# Patient Record
Sex: MALE | Race: ASIAN | Employment: FULL TIME | URBAN - METROPOLITAN AREA
[De-identification: names, ages, dates, MRNs, and addresses within clinical notes are randomized per-mention and may not be internally consistent; named-entity substitution may affect disease eponyms.]

---

## 2017-04-05 ENCOUNTER — TELEPHONE (OUTPATIENT)
Dept: INTERNAL MEDICINE CLINIC | Facility: CLINIC | Age: 57
End: 2017-04-05

## 2017-04-05 RX ORDER — VALSARTAN AND HYDROCHLOROTHIAZIDE 320; 25 MG/1; MG/1
TABLET, FILM COATED ORAL
Qty: 90 TABLET | Refills: 0 | Status: SHIPPED | OUTPATIENT
Start: 2017-04-05 | End: 2017-07-17

## 2017-04-05 RX ORDER — ATORVASTATIN CALCIUM 20 MG/1
TABLET, FILM COATED ORAL
Qty: 90 TABLET | Refills: 0 | Status: SHIPPED | OUTPATIENT
Start: 2017-04-05 | End: 2017-07-17

## 2017-05-09 ENCOUNTER — TELEPHONE (OUTPATIENT)
Dept: INTERNAL MEDICINE CLINIC | Facility: CLINIC | Age: 57
End: 2017-05-09

## 2017-05-09 DIAGNOSIS — E11.9 TYPE 2 DIABETES MELLITUS WITHOUT COMPLICATION, WITHOUT LONG-TERM CURRENT USE OF INSULIN (HCC): Primary | ICD-10-CM

## 2017-05-09 NOTE — TELEPHONE ENCOUNTER
Per pt, he has an appt on 05/19/2017 and would like to know if he can go for his blood work prior to his appt so that he can dis cuss result with dr on his appt date.

## 2017-05-15 ENCOUNTER — APPOINTMENT (OUTPATIENT)
Dept: LAB | Facility: HOSPITAL | Age: 57
End: 2017-05-15
Attending: INTERNAL MEDICINE
Payer: COMMERCIAL

## 2017-05-15 DIAGNOSIS — E11.9 TYPE 2 DIABETES MELLITUS WITHOUT COMPLICATION, WITHOUT LONG-TERM CURRENT USE OF INSULIN (HCC): ICD-10-CM

## 2017-05-15 PROCEDURE — 83036 HEMOGLOBIN GLYCOSYLATED A1C: CPT

## 2017-05-15 PROCEDURE — 36415 COLL VENOUS BLD VENIPUNCTURE: CPT

## 2017-05-19 ENCOUNTER — OFFICE VISIT (OUTPATIENT)
Dept: INTERNAL MEDICINE CLINIC | Facility: CLINIC | Age: 57
End: 2017-05-19

## 2017-05-19 VITALS
BODY MASS INDEX: 23.57 KG/M2 | RESPIRATION RATE: 16 BRPM | HEART RATE: 75 BPM | WEIGHT: 174 LBS | HEIGHT: 72 IN | DIASTOLIC BLOOD PRESSURE: 66 MMHG | SYSTOLIC BLOOD PRESSURE: 122 MMHG

## 2017-05-19 DIAGNOSIS — I10 ESSENTIAL HYPERTENSION: ICD-10-CM

## 2017-05-19 DIAGNOSIS — E11.9 TYPE 2 DIABETES MELLITUS WITHOUT COMPLICATION, WITHOUT LONG-TERM CURRENT USE OF INSULIN (HCC): Primary | ICD-10-CM

## 2017-05-19 DIAGNOSIS — G47.33 OBSTRUCTIVE SLEEP APNEA: ICD-10-CM

## 2017-05-19 PROCEDURE — 99212 OFFICE O/P EST SF 10 MIN: CPT | Performed by: INTERNAL MEDICINE

## 2017-05-19 PROCEDURE — 99213 OFFICE O/P EST LOW 20 MIN: CPT | Performed by: INTERNAL MEDICINE

## 2017-05-19 NOTE — PROGRESS NOTES
Boni Doe is a 56year old male. Patient presents with:  Diabetes  Hypertension  Hyperlipidemia    HPI:   Ti Schmitt presents this morning for six-month follow-up of type 2 diabetes, hypertension, hypercholesterolemia and obstructive sleep apnea.     He feels Comment: Infrequent         EXAM:   GENERAL: Pleasant male appearing well in no distress  /66 mmHg  Pulse 75  Resp 16  Ht 6' (1.829 m)  Wt 174 lb (78.926 kg)  BMI 23.59 kg/m2  LUNGS: Resonant to percussion and clear to auscultation  CARDIAC: Rhythm

## 2017-05-31 ENCOUNTER — TELEPHONE (OUTPATIENT)
Dept: INTERNAL MEDICINE CLINIC | Facility: CLINIC | Age: 57
End: 2017-05-31

## 2017-05-31 DIAGNOSIS — G47.33 OBSTRUCTIVE SLEEP APNEA: Primary | ICD-10-CM

## 2017-05-31 NOTE — TELEPHONE ENCOUNTER
Dolly Tavera from [facility] is calling to correct an order for CPAP machine. Dolly Tavera states CPAP machine should say Resmed Auto PAP and include pressure settings.

## 2017-07-17 ENCOUNTER — PATIENT MESSAGE (OUTPATIENT)
Dept: INTERNAL MEDICINE CLINIC | Facility: CLINIC | Age: 57
End: 2017-07-17

## 2017-07-18 RX ORDER — VALSARTAN AND HYDROCHLOROTHIAZIDE 320; 25 MG/1; MG/1
1 TABLET, FILM COATED ORAL DAILY
Qty: 90 TABLET | Refills: 0 | Status: SHIPPED | OUTPATIENT
Start: 2017-07-18 | End: 2017-10-05

## 2017-07-18 RX ORDER — ATORVASTATIN CALCIUM 20 MG/1
20 TABLET, FILM COATED ORAL NIGHTLY
Qty: 90 TABLET | Refills: 0 | Status: SHIPPED
Start: 2017-07-18 | End: 2017-10-05

## 2017-07-18 NOTE — TELEPHONE ENCOUNTER
Refilled per protocol    Cholesterol Medications  Protocol Criteria:  · Appointment scheduled in the past 12 months or in the next 3 months  · ALT & LDL on file in the past 12 months  · ALT result < 80  · LDL result <130   Recent Outpatient Visits mellitus without complication [facility])    Stacia Dumont MD    Office Visit            Lab Results  Component Value Date   A1C 5.9 05/15/2017       Lab Results  Component Value Date   CREATSERUM 0.98 09/09/2016     Hypert

## 2017-07-18 NOTE — TELEPHONE ENCOUNTER
From: Laura Lua  Sent: 7/17/2017 9:38 PM CDT  Subject: Medication Renewal Request    Dayne Brunner.  Andressa Chin would like a refill of the following medications:  ATORVASTATIN CALCIUM 20 MG Oral Tab Donald Copeland MD]  METFORMIN  MG Oral Tab Donald Copeland

## 2017-07-19 NOTE — TELEPHONE ENCOUNTER
From: Gareth Haywood  To: Ashu Lutz MD  Sent: 7/17/2017 9:44 PM CDT  Subject: Non-Urgent Medical Question    Hello,    My Chart says I'm overdue for Fit Colorectal Screening and a Colonoscopy, 10 Years. Is this correct?  I did not think it has been

## 2017-08-12 ENCOUNTER — LAB ENCOUNTER (OUTPATIENT)
Dept: LAB | Facility: HOSPITAL | Age: 57
End: 2017-08-12
Attending: INTERNAL MEDICINE
Payer: COMMERCIAL

## 2017-08-12 ENCOUNTER — HOSPITAL ENCOUNTER (OUTPATIENT)
Dept: ULTRASOUND IMAGING | Facility: HOSPITAL | Age: 57
Discharge: HOME | End: 2017-08-12
Attending: INTERNAL MEDICINE
Payer: COMMERCIAL

## 2017-08-12 ENCOUNTER — OFFICE VISIT (OUTPATIENT)
Dept: INTERNAL MEDICINE CLINIC | Facility: CLINIC | Age: 57
End: 2017-08-12

## 2017-08-12 VITALS
TEMPERATURE: 98 F | WEIGHT: 171 LBS | SYSTOLIC BLOOD PRESSURE: 130 MMHG | HEIGHT: 72 IN | DIASTOLIC BLOOD PRESSURE: 77 MMHG | BODY MASS INDEX: 23.16 KG/M2 | HEART RATE: 103 BPM

## 2017-08-12 DIAGNOSIS — I10 ESSENTIAL HYPERTENSION: ICD-10-CM

## 2017-08-12 DIAGNOSIS — R10.11 RIGHT UPPER QUADRANT PAIN: Primary | ICD-10-CM

## 2017-08-12 DIAGNOSIS — R10.11 RIGHT UPPER QUADRANT PAIN: ICD-10-CM

## 2017-08-12 LAB
BASOPHILS # BLD: 0.1 K/UL (ref 0–0.2)
BASOPHILS NFR BLD: 1 %
EOSINOPHIL # BLD: 0.2 K/UL (ref 0–0.7)
EOSINOPHIL NFR BLD: 2 %
ERYTHROCYTE [DISTWIDTH] IN BLOOD BY AUTOMATED COUNT: 13.7 % (ref 11–15)
HBA1C MFR BLD: 6.1 % (ref 4–6)
HCT VFR BLD AUTO: 48.1 % (ref 41–52)
HGB BLD-MCNC: 15.8 G/DL (ref 13.5–17.5)
LYMPHOCYTES # BLD: 1.5 K/UL (ref 1–4)
LYMPHOCYTES NFR BLD: 14 %
MCH RBC QN AUTO: 28.8 PG (ref 27–32)
MCHC RBC AUTO-ENTMCNC: 32.9 G/DL (ref 32–37)
MCV RBC AUTO: 87.6 FL (ref 80–100)
MONOCYTES # BLD: 0.9 K/UL (ref 0–1)
MONOCYTES NFR BLD: 8 %
NEUTROPHILS # BLD AUTO: 8.5 K/UL (ref 1.8–7.7)
NEUTROPHILS NFR BLD: 76 %
PLATELET # BLD AUTO: 215 K/UL (ref 140–400)
PMV BLD AUTO: 8.1 FL (ref 7.4–10.3)
RBC # BLD AUTO: 5.49 M/UL (ref 4.5–5.9)
TSH SERPL-ACNC: 0.99 UIU/ML (ref 0.45–5.33)
WBC # BLD AUTO: 11.2 K/UL (ref 4–11)

## 2017-08-12 PROCEDURE — 83036 HEMOGLOBIN GLYCOSYLATED A1C: CPT

## 2017-08-12 PROCEDURE — 36415 COLL VENOUS BLD VENIPUNCTURE: CPT

## 2017-08-12 PROCEDURE — 85025 COMPLETE CBC W/AUTO DIFF WBC: CPT

## 2017-08-12 PROCEDURE — 84443 ASSAY THYROID STIM HORMONE: CPT

## 2017-08-12 PROCEDURE — 99213 OFFICE O/P EST LOW 20 MIN: CPT | Performed by: INTERNAL MEDICINE

## 2017-08-12 PROCEDURE — 99212 OFFICE O/P EST SF 10 MIN: CPT | Performed by: INTERNAL MEDICINE

## 2017-08-12 PROCEDURE — 76705 ECHO EXAM OF ABDOMEN: CPT | Performed by: INTERNAL MEDICINE

## 2017-08-12 NOTE — PATIENT INSTRUCTIONS
Cholecystitis (Presumed)    Your abdominal pain may be due to an inflammation and possible infection in the gallbladder. This is called cholecystitis. The gallbladder is a small sac under the liver, which stores and releases bile.  Bile is a fluid that ai · Fat in your diet makes the gallbladder contract and may cause increased pain. Therefore, avoid fat in your diet over the next two days and follow a low-fat diet thereafter. If you are overweight, a low fat diet will help you lose weight.   Follow-up care

## 2017-08-12 NOTE — PROGRESS NOTES
Toma Howard is a 56year old male.   Patient presents with:  Abdominal Pain: started Wednesday, fever this morning of 100.0      HPI:   Pt is a 57 yo man comes as with c/o abd pain x 4 days   C/c and pain -- better with curling up into a ball 4/10 crampy Smokeless tobacco: Never Used                      Alcohol use: Yes           0.0 oz/week     Comment: Infrequent       REVIEW OF SYSTEMS:   GENERAL HEALTH:+ fevers,+ chills,+ sweats,+ fatigue  VISION: emergency room  Eat a bland diet, follow a low-fat low-cholesterol diet  Labs 9/16 reviewed   The patient indicates understanding of these issues and agrees to the plan. No Follow-up on file.

## 2017-08-13 ENCOUNTER — APPOINTMENT (OUTPATIENT)
Dept: LAB | Facility: HOSPITAL | Age: 57
End: 2017-08-13
Attending: INTERNAL MEDICINE
Payer: COMMERCIAL

## 2017-08-13 DIAGNOSIS — R10.11 RIGHT UPPER QUADRANT PAIN: ICD-10-CM

## 2017-08-13 LAB
ALBUMIN SERPL BCP-MCNC: 3.7 G/DL (ref 3.5–4.8)
ALBUMIN/GLOB SERPL: 1 {RATIO} (ref 1–2)
ALP SERPL-CCNC: 45 U/L (ref 32–100)
ALT SERPL-CCNC: 21 U/L (ref 17–63)
ANION GAP SERPL CALC-SCNC: 8 MMOL/L (ref 0–18)
AST SERPL-CCNC: 14 U/L (ref 15–41)
BILIRUB SERPL-MCNC: 1.2 MG/DL (ref 0.3–1.2)
BUN SERPL-MCNC: 15 MG/DL (ref 8–20)
BUN/CREAT SERPL: 18.1 (ref 10–20)
CALCIUM SERPL-MCNC: 9.2 MG/DL (ref 8.5–10.5)
CHLORIDE SERPL-SCNC: 103 MMOL/L (ref 95–110)
CHOLEST SERPL-MCNC: 120 MG/DL (ref 110–200)
CO2 SERPL-SCNC: 27 MMOL/L (ref 22–32)
CREAT SERPL-MCNC: 0.83 MG/DL (ref 0.5–1.5)
GLOBULIN PLAS-MCNC: 3.7 G/DL (ref 2.5–3.7)
GLUCOSE SERPL-MCNC: 117 MG/DL (ref 70–99)
HDLC SERPL-MCNC: 49 MG/DL
LDLC SERPL CALC-MCNC: 58 MG/DL (ref 0–99)
NONHDLC SERPL-MCNC: 71 MG/DL
OSMOLALITY UR CALC.SUM OF ELEC: 288 MOSM/KG (ref 275–295)
POTASSIUM SERPL-SCNC: 3.7 MMOL/L (ref 3.3–5.1)
PROT SERPL-MCNC: 7.4 G/DL (ref 5.9–8.4)
SODIUM SERPL-SCNC: 138 MMOL/L (ref 136–144)
TRIGL SERPL-MCNC: 63 MG/DL (ref 1–149)

## 2017-08-13 PROCEDURE — 80053 COMPREHEN METABOLIC PANEL: CPT

## 2017-08-13 PROCEDURE — 80061 LIPID PANEL: CPT

## 2017-08-13 PROCEDURE — 36415 COLL VENOUS BLD VENIPUNCTURE: CPT

## 2017-08-14 ENCOUNTER — TELEPHONE (OUTPATIENT)
Dept: CASE MANAGEMENT | Age: 57
End: 2017-08-14

## 2017-08-14 DIAGNOSIS — R10.11 RIGHT UPPER QUADRANT ABDOMINAL PAIN: Primary | ICD-10-CM

## 2017-08-14 NOTE — TELEPHONE ENCOUNTER
[employer] [place] denied [place] as not meeting medical necessity for this patient. A peer to peer can be initiated. [facility] for patient since he is scheduled for 08/16/17    Peer to peer   [phone]  [id number]  [facility] 03/25/1960    Thank you.

## 2017-08-15 ENCOUNTER — TELEPHONE (OUTPATIENT)
Dept: CASE MANAGEMENT | Age: 57
End: 2017-08-15

## 2017-08-15 NOTE — TELEPHONE ENCOUNTER
Called and spoke to the nurse at the number provided   she gave me a number and authorization number it is [id number].   It is valid from August 14 - September 12, 2017  Please let the patient know  Thank you

## 2017-08-16 ENCOUNTER — HOSPITAL ENCOUNTER (OUTPATIENT)
Dept: CT IMAGING | Facility: HOSPITAL | Age: 57
Discharge: HOME | End: 2017-08-16
Attending: INTERNAL MEDICINE
Payer: COMMERCIAL

## 2017-08-16 ENCOUNTER — PATIENT MESSAGE (OUTPATIENT)
Dept: INTERNAL MEDICINE CLINIC | Facility: CLINIC | Age: 57
End: 2017-08-16

## 2017-08-16 DIAGNOSIS — R10.11 RIGHT UPPER QUADRANT ABDOMINAL PAIN: ICD-10-CM

## 2017-08-16 PROCEDURE — 74177 CT ABD & PELVIS W/CONTRAST: CPT | Performed by: INTERNAL MEDICINE

## 2017-08-18 NOTE — TELEPHONE ENCOUNTER
From: Barbie Lopez  To: Noy Ortiz MD  Sent: 8/16/2017 9:30 PM CDT  Subject: Other    Please let Dr. Miguel Hoff know that I had the CT scan this evening (8/16) as she said to let her know when I had it done.  The technician told me that they should have t

## 2017-10-05 RX ORDER — ATORVASTATIN CALCIUM 20 MG/1
TABLET, FILM COATED ORAL
Qty: 90 TABLET | Refills: 0 | Status: SHIPPED | OUTPATIENT
Start: 2017-10-05 | End: 2017-12-31

## 2017-10-05 RX ORDER — VALSARTAN AND HYDROCHLOROTHIAZIDE 320; 25 MG/1; MG/1
1 TABLET, FILM COATED ORAL DAILY
Qty: 90 TABLET | Refills: 0 | Status: SHIPPED | OUTPATIENT
Start: 2017-10-05 | End: 2017-12-31

## 2017-12-22 ENCOUNTER — APPOINTMENT (OUTPATIENT)
Dept: LAB | Facility: HOSPITAL | Age: 57
End: 2017-12-22
Attending: INTERNAL MEDICINE
Payer: COMMERCIAL

## 2017-12-22 ENCOUNTER — OFFICE VISIT (OUTPATIENT)
Dept: INTERNAL MEDICINE CLINIC | Facility: CLINIC | Age: 57
End: 2017-12-22

## 2017-12-22 VITALS
HEART RATE: 72 BPM | HEIGHT: 72 IN | SYSTOLIC BLOOD PRESSURE: 112 MMHG | BODY MASS INDEX: 23.43 KG/M2 | DIASTOLIC BLOOD PRESSURE: 68 MMHG | WEIGHT: 173 LBS

## 2017-12-22 DIAGNOSIS — K76.0 HEPATIC STEATOSIS: ICD-10-CM

## 2017-12-22 DIAGNOSIS — Z00.00 ANNUAL PHYSICAL EXAM: Primary | ICD-10-CM

## 2017-12-22 DIAGNOSIS — E11.9 TYPE 2 DIABETES MELLITUS WITHOUT COMPLICATION, WITHOUT LONG-TERM CURRENT USE OF INSULIN (HCC): ICD-10-CM

## 2017-12-22 DIAGNOSIS — I10 ESSENTIAL HYPERTENSION: ICD-10-CM

## 2017-12-22 DIAGNOSIS — G47.33 OBSTRUCTIVE SLEEP APNEA: ICD-10-CM

## 2017-12-22 DIAGNOSIS — Z00.00 ANNUAL PHYSICAL EXAM: ICD-10-CM

## 2017-12-22 DIAGNOSIS — E78.5 DYSLIPIDEMIA: ICD-10-CM

## 2017-12-22 DIAGNOSIS — I70.0 AORTIC ATHEROSCLEROSIS (HCC): ICD-10-CM

## 2017-12-22 DIAGNOSIS — K57.30 DIVERTICULOSIS OF LARGE INTESTINE WITHOUT HEMORRHAGE: ICD-10-CM

## 2017-12-22 PROCEDURE — 90686 IIV4 VACC NO PRSV 0.5 ML IM: CPT | Performed by: INTERNAL MEDICINE

## 2017-12-22 PROCEDURE — 82570 ASSAY OF URINE CREATININE: CPT

## 2017-12-22 PROCEDURE — 99396 PREV VISIT EST AGE 40-64: CPT | Performed by: INTERNAL MEDICINE

## 2017-12-22 PROCEDURE — 36415 COLL VENOUS BLD VENIPUNCTURE: CPT

## 2017-12-22 PROCEDURE — 82043 UR ALBUMIN QUANTITATIVE: CPT

## 2017-12-22 PROCEDURE — 90471 IMMUNIZATION ADMIN: CPT | Performed by: INTERNAL MEDICINE

## 2017-12-22 PROCEDURE — 83036 HEMOGLOBIN GLYCOSYLATED A1C: CPT

## 2017-12-22 NOTE — H&P
Boni Doe is a 56year old male who presents for a complete physical exam.   HPI:   He feels well today. No specific issues for discussion. GI symptoms which prompted a visit in the fall have resolved.   Accu-Cheks performed fasting 2 days a week noman hepatitis virus serologies, ANTHONY) negative   • Obstructive sleep apnea June 2009    On nightly CPAP   • Pneumonia [employer]   • Type 2 diabetes mellitus [facility]) August 2015      Past Surgical History:  2002: EYE SURGERY Bilateral      Comment: Radial keratotomy or clubbing, with normal-appearing feet without ulcerations or lesions  PULSES: Carotid upstrokes 2+ without carotid bruits, distal pulses 2+ bilateral dorsalis pedis and posterior tibial  NEURO: Sensory testing with the 10 g monofilament diabetic sensory

## 2017-12-22 NOTE — PATIENT INSTRUCTIONS
You received a flu shot today. Await results of blood and urine testing. Continue current medications. Continue to eat healthy and exercise regularly. Return visit in 6 months.

## 2017-12-26 ENCOUNTER — MED REC SCAN ONLY (OUTPATIENT)
Dept: INTERNAL MEDICINE CLINIC | Facility: CLINIC | Age: 57
End: 2017-12-26

## 2017-12-31 RX ORDER — VALSARTAN AND HYDROCHLOROTHIAZIDE 320; 25 MG/1; MG/1
1 TABLET, FILM COATED ORAL DAILY
Qty: 90 TABLET | Refills: 1 | Status: SHIPPED | OUTPATIENT
Start: 2017-12-31 | End: 2018-06-08

## 2017-12-31 RX ORDER — ATORVASTATIN CALCIUM 20 MG/1
TABLET, FILM COATED ORAL
Qty: 90 TABLET | Refills: 1 | Status: SHIPPED | OUTPATIENT
Start: 2017-12-31 | End: 2018-06-08

## 2018-01-10 ENCOUNTER — MED REC SCAN ONLY (OUTPATIENT)
Dept: INTERNAL MEDICINE CLINIC | Facility: CLINIC | Age: 58
End: 2018-01-10

## 2018-06-08 ENCOUNTER — TELEPHONE (OUTPATIENT)
Dept: INTERNAL MEDICINE CLINIC | Facility: CLINIC | Age: 58
End: 2018-06-08

## 2018-06-08 DIAGNOSIS — E11.9 TYPE 2 DIABETES MELLITUS WITHOUT COMPLICATION, WITHOUT LONG-TERM CURRENT USE OF INSULIN (HCC): Primary | ICD-10-CM

## 2018-06-08 RX ORDER — ATORVASTATIN CALCIUM 20 MG/1
TABLET, FILM COATED ORAL
Qty: 90 TABLET | Refills: 0 | Status: SHIPPED | OUTPATIENT
Start: 2018-06-08 | End: 2018-08-26

## 2018-06-08 RX ORDER — VALSARTAN AND HYDROCHLOROTHIAZIDE 320; 25 MG/1; MG/1
1 TABLET, FILM COATED ORAL DAILY
Qty: 90 TABLET | Refills: 0 | Status: SHIPPED | OUTPATIENT
Start: 2018-06-08 | End: 2018-08-26

## 2018-06-08 NOTE — TELEPHONE ENCOUNTER
Per pt, he would like to know if NK can send his Order so that he can do his blood work prior an appt.

## 2018-06-12 NOTE — TELEPHONE ENCOUNTER
Contacted pt and informed him that A1c level has been ordered and in the system.   Pt verbalized understanding

## 2018-07-05 ENCOUNTER — APPOINTMENT (OUTPATIENT)
Dept: LAB | Facility: HOSPITAL | Age: 58
End: 2018-07-05
Attending: INTERNAL MEDICINE
Payer: COMMERCIAL

## 2018-07-05 DIAGNOSIS — E11.9 TYPE 2 DIABETES MELLITUS WITHOUT COMPLICATION, WITHOUT LONG-TERM CURRENT USE OF INSULIN (HCC): ICD-10-CM

## 2018-07-05 LAB — HBA1C MFR BLD: 6.2 % (ref 4–6)

## 2018-07-05 PROCEDURE — 36415 COLL VENOUS BLD VENIPUNCTURE: CPT

## 2018-07-05 PROCEDURE — 83036 HEMOGLOBIN GLYCOSYLATED A1C: CPT

## 2018-07-10 ENCOUNTER — OFFICE VISIT (OUTPATIENT)
Dept: INTERNAL MEDICINE CLINIC | Facility: CLINIC | Age: 58
End: 2018-07-10

## 2018-07-10 VITALS
HEART RATE: 66 BPM | HEIGHT: 72 IN | DIASTOLIC BLOOD PRESSURE: 68 MMHG | BODY MASS INDEX: 23.98 KG/M2 | WEIGHT: 177 LBS | SYSTOLIC BLOOD PRESSURE: 116 MMHG

## 2018-07-10 DIAGNOSIS — I10 ESSENTIAL HYPERTENSION: ICD-10-CM

## 2018-07-10 DIAGNOSIS — E11.9 TYPE 2 DIABETES MELLITUS WITHOUT COMPLICATION, WITHOUT LONG-TERM CURRENT USE OF INSULIN (HCC): Primary | ICD-10-CM

## 2018-07-10 PROCEDURE — 99213 OFFICE O/P EST LOW 20 MIN: CPT | Performed by: INTERNAL MEDICINE

## 2018-07-10 PROCEDURE — 99212 OFFICE O/P EST SF 10 MIN: CPT | Performed by: INTERNAL MEDICINE

## 2018-07-10 NOTE — PATIENT INSTRUCTIONS
Please continue current medication. Continue to follow a healthy diet and to exercise regularly. Please schedule a physical in December.

## 2018-07-10 NOTE — PROGRESS NOTES
Harish Harvey is a 66year old male. Patient presents with:  Diabetes    HPI:   Ana Rashad presents this morning for follow-up of type 2 diabetes, hypertension and hypercholesterolemia. He feels well.   Work has been somewhat stressful recently with frequent tr Smokeless tobacco: Never Used                      Alcohol use: Yes           0.0 oz/week     Comment: Infrequent       EXAM:   GENERAL: Pleasant male appearing well in no distress  /68   Pulse 66   Ht 6' (1.829 m)   Wt 177 lb (80.3 kg)   BM

## 2018-08-27 RX ORDER — VALSARTAN AND HYDROCHLOROTHIAZIDE 320; 25 MG/1; MG/1
1 TABLET, FILM COATED ORAL DAILY
Qty: 90 TABLET | Refills: 1 | Status: SHIPPED | OUTPATIENT
Start: 2018-08-27 | End: 2018-09-14

## 2018-08-27 RX ORDER — ATORVASTATIN CALCIUM 20 MG/1
TABLET, FILM COATED ORAL
Qty: 90 TABLET | Refills: 1 | Status: SHIPPED | OUTPATIENT
Start: 2018-08-27 | End: 2019-02-26

## 2018-09-07 ENCOUNTER — PATIENT MESSAGE (OUTPATIENT)
Dept: INTERNAL MEDICINE CLINIC | Facility: CLINIC | Age: 58
End: 2018-09-07

## 2018-09-07 NOTE — TELEPHONE ENCOUNTER
Please stop valsartan/HCTZ and substitute losartan/HCTZ 100/25 mg #90 1 pill daily with 1 refill, and update medication list.

## 2018-09-07 NOTE — TELEPHONE ENCOUNTER
Dawood Wild RN 9/7/2018 12:05 PM CDT        ----- Message -----  From: Pearle Lanes  Sent: 9/7/2018 9:47 AM  To: Em Rn Triage  Subject: Prescription Question     Dr. Sean Curran, I received a notice from [facility] that my prescription of Valsartan/HCTZ 320

## 2019-02-02 ENCOUNTER — TELEPHONE (OUTPATIENT)
Dept: INTERNAL MEDICINE CLINIC | Facility: CLINIC | Age: 59
End: 2019-02-02

## 2019-02-02 DIAGNOSIS — Z00.00 ANNUAL PHYSICAL EXAM: Primary | ICD-10-CM

## 2019-02-05 ENCOUNTER — APPOINTMENT (OUTPATIENT)
Dept: LAB | Facility: HOSPITAL | Age: 59
End: 2019-02-05
Attending: INTERNAL MEDICINE
Payer: COMMERCIAL

## 2019-02-05 DIAGNOSIS — Z00.00 ANNUAL PHYSICAL EXAM: ICD-10-CM

## 2019-02-05 LAB
ALBUMIN SERPL BCP-MCNC: 3.9 G/DL (ref 3.5–4.8)
ALBUMIN/GLOB SERPL: 1.1 {RATIO} (ref 1–2)
ALP SERPL-CCNC: 47 U/L (ref 32–100)
ALT SERPL-CCNC: 35 U/L (ref 17–63)
ANION GAP SERPL CALC-SCNC: 10 MMOL/L (ref 0–18)
AST SERPL-CCNC: 22 U/L (ref 15–41)
BILIRUB SERPL-MCNC: 1.1 MG/DL (ref 0.3–1.2)
BUN SERPL-MCNC: 14 MG/DL (ref 8–20)
BUN/CREAT SERPL: 15.6 (ref 10–20)
CALCIUM SERPL-MCNC: 8.9 MG/DL (ref 8.5–10.5)
CHLORIDE SERPL-SCNC: 102 MMOL/L (ref 95–110)
CHOLEST SERPL-MCNC: 105 MG/DL (ref 110–200)
CO2 SERPL-SCNC: 26 MMOL/L (ref 22–32)
COMPLEXED PSA SERPL-MCNC: 2.11 NG/ML (ref 0.01–4)
CREAT SERPL-MCNC: 0.9 MG/DL (ref 0.5–1.5)
CREAT UR-MCNC: 226.9 MG/DL
DEPRECATED RDW RBC AUTO: 43.8 FL (ref 35.1–46.3)
ERYTHROCYTE [DISTWIDTH] IN BLOOD BY AUTOMATED COUNT: 13.6 % (ref 11–15)
EST. AVERAGE GLUCOSE BLD GHB EST-MCNC: 137 MG/DL (ref 68–126)
GLOBULIN PLAS-MCNC: 3.5 G/DL (ref 2.5–3.7)
GLUCOSE SERPL-MCNC: 124 MG/DL (ref 70–99)
HBA1C MFR BLD HPLC: 6.4 % (ref ?–5.7)
HCT VFR BLD AUTO: 48.6 % (ref 39–53)
HDLC SERPL-MCNC: 38 MG/DL
HGB BLD-MCNC: 15.9 G/DL (ref 13–17.5)
LDLC SERPL CALC-MCNC: 53 MG/DL (ref 0–99)
MCH RBC QN AUTO: 29 PG (ref 26–34)
MCHC RBC AUTO-ENTMCNC: 32.7 G/DL (ref 31–37)
MCV RBC AUTO: 88.5 FL (ref 80–100)
MICROALBUMIN UR-MCNC: 1.5 MG/DL (ref 0–1.8)
MICROALBUMIN/CREAT UR: 6.6 MG/G{CREAT} (ref 0–20)
NONHDLC SERPL-MCNC: 67 MG/DL
OSMOLALITY UR CALC.SUM OF ELEC: 288 MOSM/KG (ref 275–295)
PATIENT FASTING: YES
PLATELET # BLD AUTO: 281 10(3)UL (ref 150–450)
POTASSIUM SERPL-SCNC: 3.7 MMOL/L (ref 3.3–5.1)
PROT SERPL-MCNC: 7.4 G/DL (ref 5.9–8.4)
PSA SERPL-MCNC: 2.3 NG/ML (ref 0–4)
RBC # BLD AUTO: 5.49 X10(6)UL (ref 4.3–5.7)
SODIUM SERPL-SCNC: 138 MMOL/L (ref 136–144)
TRIGL SERPL-MCNC: 72 MG/DL (ref 1–149)
WBC # BLD AUTO: 4.7 X10(3) UL (ref 4–11)

## 2019-02-05 PROCEDURE — 80061 LIPID PANEL: CPT

## 2019-02-05 PROCEDURE — 80053 COMPREHEN METABOLIC PANEL: CPT

## 2019-02-05 PROCEDURE — 82043 UR ALBUMIN QUANTITATIVE: CPT

## 2019-02-05 PROCEDURE — 36415 COLL VENOUS BLD VENIPUNCTURE: CPT

## 2019-02-05 PROCEDURE — 85027 COMPLETE CBC AUTOMATED: CPT

## 2019-02-05 PROCEDURE — 83036 HEMOGLOBIN GLYCOSYLATED A1C: CPT

## 2019-02-05 PROCEDURE — 82570 ASSAY OF URINE CREATININE: CPT

## 2019-02-08 ENCOUNTER — OFFICE VISIT (OUTPATIENT)
Dept: INTERNAL MEDICINE CLINIC | Facility: CLINIC | Age: 59
End: 2019-02-08
Payer: COMMERCIAL

## 2019-02-08 VITALS
DIASTOLIC BLOOD PRESSURE: 74 MMHG | BODY MASS INDEX: 23.84 KG/M2 | HEART RATE: 76 BPM | HEIGHT: 72 IN | WEIGHT: 176 LBS | SYSTOLIC BLOOD PRESSURE: 122 MMHG

## 2019-02-08 DIAGNOSIS — K57.30 DIVERTICULOSIS OF LARGE INTESTINE WITHOUT HEMORRHAGE: ICD-10-CM

## 2019-02-08 DIAGNOSIS — I70.0 AORTIC ATHEROSCLEROSIS (HCC): ICD-10-CM

## 2019-02-08 DIAGNOSIS — K76.0 HEPATIC STEATOSIS: ICD-10-CM

## 2019-02-08 DIAGNOSIS — G47.33 OBSTRUCTIVE SLEEP APNEA: ICD-10-CM

## 2019-02-08 DIAGNOSIS — I10 ESSENTIAL HYPERTENSION: ICD-10-CM

## 2019-02-08 DIAGNOSIS — E78.5 DYSLIPIDEMIA: ICD-10-CM

## 2019-02-08 DIAGNOSIS — Z00.00 ANNUAL PHYSICAL EXAM: Primary | ICD-10-CM

## 2019-02-08 DIAGNOSIS — E11.9 TYPE 2 DIABETES MELLITUS WITHOUT COMPLICATION, WITHOUT LONG-TERM CURRENT USE OF INSULIN (HCC): ICD-10-CM

## 2019-02-08 PROCEDURE — 99396 PREV VISIT EST AGE 40-64: CPT | Performed by: INTERNAL MEDICINE

## 2019-02-08 NOTE — PATIENT INSTRUCTIONS
Please continue current medication. Continue healthy diet and regular exercise. Await upcoming appointment with Ophthalmology. Return visit in 6 months.

## 2019-02-08 NOTE — H&P
Gareth Haywood is a 66year old male who presents for a complete physical exam.   HPI:   He feels well. No particular issues for discussion. Accu-Cheks done 1-2 times weekly 110-120s. BP checks at home 120s/70s. Diet healthy.   He exercises 3-4 days week Surgical History:   Procedure Laterality Date   • EYE SURGERY Bilateral 2002    Radial keratotomy   • EYE SURGERY Left October 2013   • SHOULDER ARTHROSCOPY Right March 2011    Rotator cuff repair   • SHOULDER ARTHROSCOPY Right September 2011    Rotator cu posterior tibial  NEURO: Sensory testing with the 10 g monofilament diabetic sensory exam instrument is normal in both feet  GENITAL: Testes normal without mass and without inguinal hernia  RECTAL: Prostate normal without enlargement or induration and with

## 2019-02-26 ENCOUNTER — MED REC SCAN ONLY (OUTPATIENT)
Dept: INTERNAL MEDICINE CLINIC | Facility: CLINIC | Age: 59
End: 2019-02-26

## 2019-02-26 RX ORDER — ATORVASTATIN CALCIUM 20 MG/1
20 TABLET, FILM COATED ORAL
Qty: 90 TABLET | Refills: 1 | Status: CANCELLED | OUTPATIENT
Start: 2019-02-26

## 2019-02-26 RX ORDER — LOSARTAN POTASSIUM AND HYDROCHLOROTHIAZIDE 25; 100 MG/1; MG/1
1 TABLET ORAL DAILY
Qty: 90 TABLET | Refills: 1 | Status: CANCELLED | OUTPATIENT
Start: 2019-02-26

## 2019-02-26 NOTE — TELEPHONE ENCOUNTER
pharmacy is requesting refill         Current Outpatient Medications:  Losartan Potassium-HCTZ 100-25 MG Oral Tab Take 1 tablet by mouth daily.  Disp: 90 tablet Rfl: 1   ATORVASTATIN 20 MG Oral Tab TAKE 1 TABLET BY MOUTH NIGHTLY Disp: 90 tablet Rfl: 1   MET

## 2019-02-27 RX ORDER — ATORVASTATIN CALCIUM 20 MG/1
TABLET, FILM COATED ORAL
Qty: 90 TABLET | Refills: 0 | Status: SHIPPED | OUTPATIENT
Start: 2019-02-27 | End: 2019-06-01

## 2019-02-27 RX ORDER — LOSARTAN POTASSIUM AND HYDROCHLOROTHIAZIDE 25; 100 MG/1; MG/1
1 TABLET ORAL DAILY
Qty: 90 TABLET | Refills: 0 | Status: SHIPPED | OUTPATIENT
Start: 2019-02-27 | End: 2019-03-17

## 2019-02-28 NOTE — TELEPHONE ENCOUNTER
Refill passed per [facility], [facility] protocol.   Hypertensive Medications  Protocol Criteria:  · Appointment scheduled in the past 6 months or in the next 3 months  · BMP or CMP in the past 12 months  · Creatinine result < 2  Recent Outpatient Visits use of insulin [facility])    Gareth Munguia MD    Office Visit    1 year ago Annual physical exam    Gareth Munguia MD    Office Visit    1 year ago Right upper quadrant pain    INSKIP

## 2019-03-18 RX ORDER — LOSARTAN POTASSIUM AND HYDROCHLOROTHIAZIDE 25; 100 MG/1; MG/1
1 TABLET ORAL DAILY
Qty: 90 TABLET | Refills: 1 | Status: SHIPPED | OUTPATIENT
Start: 2019-03-18 | End: 2019-06-01

## 2019-06-03 RX ORDER — ATORVASTATIN CALCIUM 20 MG/1
TABLET, FILM COATED ORAL
Qty: 90 TABLET | Refills: 1 | Status: SHIPPED | OUTPATIENT
Start: 2019-06-03 | End: 2019-11-04

## 2019-06-03 RX ORDER — LOSARTAN POTASSIUM AND HYDROCHLOROTHIAZIDE 25; 100 MG/1; MG/1
1 TABLET ORAL DAILY
Qty: 90 TABLET | Refills: 1 | Status: SHIPPED | OUTPATIENT
Start: 2019-06-03 | End: 2019-11-06

## 2019-06-03 NOTE — TELEPHONE ENCOUNTER
Hypertensive Medications  Protocol Criteria:  · Appointment scheduled in the past 6 months or in the next 3 months  · BMP or CMP in the past 12 months  · Creatinine result < 2  Recent Outpatient Visits            3 months ago Annual physical exam    Husam Iqbal Ginette Carrasquillo MD    Office Visit    1 year ago Annual physical exam    Chon Mcneil MD    Office Visit    1 year ago Right upper quadrant pain    [facility], [facility], [address] Lesly Castelan A

## 2019-06-03 NOTE — TELEPHONE ENCOUNTER
Refill passed per [facility] protocol.   Diabetes Medications  Protocol Criteria:  · Appointment scheduled in the past 6 months or the next 3 months  · A1C < 7.5 in the past 6 months  · Creatinine in the past 12 months  · Creatinine result < 1.5   Rece mellitus without complication, without long-term current use of insulin [facility])    Meka Pemberton MD    Office Visit          Lab Results   Component Value Date    LDL 53 02/05/2019     Lab Results   Component Value Da

## 2019-06-10 ENCOUNTER — OFFICE VISIT (OUTPATIENT)
Dept: INTERNAL MEDICINE CLINIC | Facility: CLINIC | Age: 59
End: 2019-06-10
Payer: COMMERCIAL

## 2019-06-10 VITALS
DIASTOLIC BLOOD PRESSURE: 78 MMHG | HEART RATE: 106 BPM | BODY MASS INDEX: 23.84 KG/M2 | SYSTOLIC BLOOD PRESSURE: 128 MMHG | WEIGHT: 176 LBS | TEMPERATURE: 99 F | HEIGHT: 72 IN

## 2019-06-10 DIAGNOSIS — R51.9 ACUTE NONINTRACTABLE HEADACHE, UNSPECIFIED HEADACHE TYPE: Primary | ICD-10-CM

## 2019-06-10 PROCEDURE — 99213 OFFICE O/P EST LOW 20 MIN: CPT | Performed by: INTERNAL MEDICINE

## 2019-06-10 PROCEDURE — 99212 OFFICE O/P EST SF 10 MIN: CPT | Performed by: INTERNAL MEDICINE

## 2019-06-10 NOTE — PROGRESS NOTES
Saadia Mckinney is a 64year old male. Patient presents with:  Headache    HPI:   Since Monday, 7 days ago, he has had intermittent low-grade fever up to 100.8 degrees and a dull headache.   Headache is not severe, is bilateral and primarily occipital.  He ha History:  Social History    Tobacco Use      Smoking status: Never Smoker      Smokeless tobacco: Never Used    Alcohol use:  Yes      Alcohol/week: 0.0 oz      Comment: Infrequent    Drug use: No       EXAM:   GENERAL: Pleasant male appearing well in no di

## 2019-06-14 ENCOUNTER — TELEPHONE (OUTPATIENT)
Dept: INTERNAL MEDICINE CLINIC | Facility: CLINIC | Age: 59
End: 2019-06-14

## 2019-06-14 ENCOUNTER — PATIENT MESSAGE (OUTPATIENT)
Dept: INTERNAL MEDICINE CLINIC | Facility: CLINIC | Age: 59
End: 2019-06-14

## 2019-06-14 RX ORDER — BENZONATATE 100 MG/1
100 CAPSULE ORAL 3 TIMES DAILY PRN
Qty: 20 CAPSULE | Refills: 0 | Status: SHIPPED | OUTPATIENT
Start: 2019-06-14 | End: 2019-06-20 | Stop reason: ALTCHOICE

## 2019-06-14 NOTE — TELEPHONE ENCOUNTER
Spoke with patient in formed of [place] note below.  He will call next week if not better     Patient verbalizes understanding and agrees

## 2019-06-14 NOTE — TELEPHONE ENCOUNTER
From: Harish Harvey  To: Anna Skaggs MD  Sent: 6/14/2019 7:55 AM CDT  Subject: Visit Follow-up Question    Dr. Cory Vega,    My symptoms have not improved since our visit on Monday.  The cough has gotten worse and the headache and low grade fever are still

## 2019-06-14 NOTE — TELEPHONE ENCOUNTER
Symptoms still sound viral.  Prescription for benzonatate 100 mg 3 times daily as needed for coughing sent to his [facility] pharmacy. Recommend return visit early next week if not better by then.

## 2019-06-14 NOTE — TELEPHONE ENCOUNTER
LMTCB transfer to triage  Advised to call triage on MyChart    ----- Message from Aditya Chowdary sent at 6/14/2019  7:55 AM CDT -----  Regarding: Visit Follow-up Question  Contact: [phone]  Dr. Winter Salcedo,    My symptoms have not improved since our visit o

## 2019-06-18 ENCOUNTER — PATIENT MESSAGE (OUTPATIENT)
Dept: INTERNAL MEDICINE CLINIC | Facility: CLINIC | Age: 59
End: 2019-06-18

## 2019-06-19 NOTE — TELEPHONE ENCOUNTER
From: Todd Ivan  To: Gloria Plummer MD  Sent: 6/18/2019 10:37 PM CDT  Subject: Visit Follow-up Question    Hello,    Just to follow up again, the low grade fever was still present this evening at 99.3, which was lower than it has been, so I guess that

## 2019-06-19 NOTE — TELEPHONE ENCOUNTER
Copied from 6/14/19 Condition update encounter:    Krys Escalona MD           6/14/19 11:59 AM   Note      Symptoms still sound viral.  Prescription for benzonatate 100 mg 3 times daily as needed for coughing sent to his [facility] pharmacy.   Recommend return v

## 2019-06-20 ENCOUNTER — HOSPITAL ENCOUNTER (OUTPATIENT)
Dept: GENERAL RADIOLOGY | Facility: HOSPITAL | Age: 59
Discharge: HOME | End: 2019-06-20
Attending: INTERNAL MEDICINE
Payer: COMMERCIAL

## 2019-06-20 ENCOUNTER — OFFICE VISIT (OUTPATIENT)
Dept: INTERNAL MEDICINE CLINIC | Facility: CLINIC | Age: 59
End: 2019-06-20
Payer: COMMERCIAL

## 2019-06-20 VITALS
HEART RATE: 86 BPM | WEIGHT: 176 LBS | HEIGHT: 72 IN | TEMPERATURE: 98 F | OXYGEN SATURATION: 96 % | DIASTOLIC BLOOD PRESSURE: 77 MMHG | BODY MASS INDEX: 23.84 KG/M2 | SYSTOLIC BLOOD PRESSURE: 123 MMHG

## 2019-06-20 DIAGNOSIS — J13 PNEUMONIA OF LEFT LOWER LOBE DUE TO STREPTOCOCCUS PNEUMONIAE (HCC): Primary | ICD-10-CM

## 2019-06-20 DIAGNOSIS — J13 PNEUMONIA OF LEFT LOWER LOBE DUE TO STREPTOCOCCUS PNEUMONIAE (HCC): ICD-10-CM

## 2019-06-20 PROCEDURE — 99212 OFFICE O/P EST SF 10 MIN: CPT | Performed by: INTERNAL MEDICINE

## 2019-06-20 PROCEDURE — 99213 OFFICE O/P EST LOW 20 MIN: CPT | Performed by: INTERNAL MEDICINE

## 2019-06-20 PROCEDURE — 71046 X-RAY EXAM CHEST 2 VIEWS: CPT | Performed by: INTERNAL MEDICINE

## 2019-06-20 RX ORDER — AZITHROMYCIN 250 MG/1
TABLET, FILM COATED ORAL
Qty: 6 TABLET | Refills: 0 | Status: SHIPPED | OUTPATIENT
Start: 2019-06-20 | End: 2019-08-16 | Stop reason: ALTCHOICE

## 2019-06-20 NOTE — PROGRESS NOTES
Toma Howard is a 64year old male.   Patient presents with:  Cough: and fever seen Dr. Jolly Camarillo       HPI:   Pt comes as and urgent visit   C/c cough and congestion   C/o about 20 days ago -- June 1st tired and the next day ALEJO and June 5th had a 100.4 fever Never Used    Alcohol use:  Yes      Alcohol/week: 0.0 oz      Comment: Infrequent    Drug use: No       REVIEW OF SYSTEMS:   GENERAL HEALTH: + fevers, +chills, +sweats, + fatigue  HEENT: No decreased hearing ear pain but had some right ear pressure , not p

## 2019-08-16 ENCOUNTER — OFFICE VISIT (OUTPATIENT)
Dept: INTERNAL MEDICINE CLINIC | Facility: CLINIC | Age: 59
End: 2019-08-16
Payer: COMMERCIAL

## 2019-08-16 ENCOUNTER — HOSPITAL ENCOUNTER (OUTPATIENT)
Dept: GENERAL RADIOLOGY | Facility: HOSPITAL | Age: 59
Discharge: HOME | End: 2019-08-16
Attending: INTERNAL MEDICINE
Payer: COMMERCIAL

## 2019-08-16 VITALS
BODY MASS INDEX: 24.38 KG/M2 | HEART RATE: 98 BPM | DIASTOLIC BLOOD PRESSURE: 74 MMHG | WEIGHT: 180 LBS | HEIGHT: 72 IN | SYSTOLIC BLOOD PRESSURE: 116 MMHG

## 2019-08-16 DIAGNOSIS — J18.9 PNEUMONIA OF RIGHT UPPER LOBE DUE TO INFECTIOUS ORGANISM: ICD-10-CM

## 2019-08-16 DIAGNOSIS — R91.1 PULMONARY NODULE: ICD-10-CM

## 2019-08-16 DIAGNOSIS — J18.9 PNEUMONIA OF RIGHT UPPER LOBE DUE TO INFECTIOUS ORGANISM: Primary | ICD-10-CM

## 2019-08-16 PROCEDURE — 71046 X-RAY EXAM CHEST 2 VIEWS: CPT | Performed by: INTERNAL MEDICINE

## 2019-08-16 PROCEDURE — 99213 OFFICE O/P EST LOW 20 MIN: CPT | Performed by: INTERNAL MEDICINE

## 2019-08-16 NOTE — PROGRESS NOTES
Xi Knapp is a 64year old male. Patient presents with: Follow - Up: F/u on spot on the lung that was discovered on recent chest xray    HPI:   Marsha Peña presents this afternoon for follow-up of abnormal chest x-ray.     Because of respiratory symptoms, alan 1997      Social History:  Social History    Tobacco Use      Smoking status: Never Smoker      Smokeless tobacco: Never Used    Alcohol use:  Yes      Alcohol/week: 0.0 standard drinks      Comment: Infrequent    Drug use: No       EXAM:   GENERAL: Mariusz

## 2019-11-04 NOTE — TELEPHONE ENCOUNTER
Current Outpatient Medications   Medication Sig Dispense Refill   • METFORMIN  MG Oral Tab TAKE 1 TABLET BY MOUTH TWICE DAILY WITH MEALS 180 tablet 1   • ATORVASTATIN 20 MG Oral Tab TAKE 1 TABLET BY MOUTH NIGHTLY 90 tablet 1   • LOSARTAN POTASSIUM-H

## 2019-11-06 RX ORDER — ATORVASTATIN CALCIUM 20 MG/1
20 TABLET, FILM COATED ORAL NIGHTLY
Qty: 90 TABLET | Refills: 1 | Status: SHIPPED | OUTPATIENT
Start: 2019-11-06 | End: 2020-05-19

## 2019-11-06 RX ORDER — LOSARTAN POTASSIUM AND HYDROCHLOROTHIAZIDE 25; 100 MG/1; MG/1
1 TABLET ORAL DAILY
Qty: 90 TABLET | Refills: 1 | Status: SHIPPED | OUTPATIENT
Start: 2019-11-06 | End: 2020-05-21

## 2019-11-06 NOTE — TELEPHONE ENCOUNTER
Please review; DM protocol failed. Refill passed per [facility] protocol.     Hypertensive Medications  Protocol Criteria:  · Appointment scheduled in the past 6 months or in the next 3 months  · BMP or CMP in the past 12 months  · Creatinine resu Samreen Cortes MD    Office Visit    4 months ago Pneumonia of left lower lobe due to Streptococcus pneumoniae [facility])    John Licona MD    Office Visit    4 months ago Acute nonintractable head

## 2019-11-08 RX ORDER — LOSARTAN POTASSIUM AND HYDROCHLOROTHIAZIDE 25; 100 MG/1; MG/1
1 TABLET ORAL DAILY
Qty: 90 TABLET | Refills: 1 | OUTPATIENT
Start: 2019-11-08

## 2019-11-08 RX ORDER — ATORVASTATIN CALCIUM 20 MG/1
20 TABLET, FILM COATED ORAL NIGHTLY
Qty: 90 TABLET | Refills: 1 | OUTPATIENT
Start: 2019-11-08

## 2020-02-12 ENCOUNTER — APPOINTMENT (OUTPATIENT)
Dept: LAB | Facility: HOSPITAL | Age: 60
End: 2020-02-12
Attending: INTERNAL MEDICINE
Payer: COMMERCIAL

## 2020-02-12 DIAGNOSIS — Z00.00 ANNUAL PHYSICAL EXAM: ICD-10-CM

## 2020-02-12 LAB
ALBUMIN SERPL-MCNC: 3.8 G/DL (ref 3.4–5)
ALBUMIN/GLOB SERPL: 1 {RATIO} (ref 1–2)
ALP LIVER SERPL-CCNC: 56 U/L (ref 45–117)
ALT SERPL-CCNC: 50 U/L (ref 16–61)
ANION GAP SERPL CALC-SCNC: 2 MMOL/L (ref 0–18)
AST SERPL-CCNC: 19 U/L (ref 15–37)
BILIRUB SERPL-MCNC: 1.2 MG/DL (ref 0.1–2)
BUN BLD-MCNC: 18 MG/DL (ref 7–18)
BUN/CREAT SERPL: 17.8 (ref 10–20)
CALCIUM BLD-MCNC: 8.7 MG/DL (ref 8.5–10.1)
CHLORIDE SERPL-SCNC: 103 MMOL/L (ref 98–112)
CHOLEST SMN-MCNC: 122 MG/DL (ref ?–200)
CO2 SERPL-SCNC: 33 MMOL/L (ref 21–32)
COMPLEXED PSA SERPL-MCNC: 1.73 NG/ML (ref ?–4)
CREAT BLD-MCNC: 1.01 MG/DL (ref 0.7–1.3)
CREAT UR-SCNC: 258 MG/DL
DEPRECATED RDW RBC AUTO: 41.4 FL (ref 35.1–46.3)
ERYTHROCYTE [DISTWIDTH] IN BLOOD BY AUTOMATED COUNT: 13.3 % (ref 11–15)
EST. AVERAGE GLUCOSE BLD GHB EST-MCNC: 143 MG/DL (ref 68–126)
GLOBULIN PLAS-MCNC: 3.8 G/DL (ref 2.8–4.4)
GLUCOSE BLD-MCNC: 129 MG/DL (ref 70–99)
HBA1C MFR BLD HPLC: 6.6 % (ref ?–5.7)
HCT VFR BLD AUTO: 47.6 % (ref 39–53)
HDLC SERPL-MCNC: 43 MG/DL (ref 40–59)
HGB BLD-MCNC: 15.9 G/DL (ref 13–17.5)
LDLC SERPL CALC-MCNC: 61 MG/DL (ref ?–100)
M PROTEIN MFR SERPL ELPH: 7.6 G/DL (ref 6.4–8.2)
MCH RBC QN AUTO: 29 PG (ref 26–34)
MCHC RBC AUTO-ENTMCNC: 33.4 G/DL (ref 31–37)
MCV RBC AUTO: 86.7 FL (ref 80–100)
MICROALBUMIN UR-MCNC: 2.61 MG/DL
MICROALBUMIN/CREAT 24H UR-RTO: 10.1 UG/MG (ref ?–30)
NONHDLC SERPL-MCNC: 79 MG/DL (ref ?–130)
OSMOLALITY SERPL CALC.SUM OF ELEC: 290 MOSM/KG (ref 275–295)
PATIENT FASTING Y/N/NP: YES
PATIENT FASTING Y/N/NP: YES
PLATELET # BLD AUTO: 235 10(3)UL (ref 150–450)
POTASSIUM SERPL-SCNC: 3.2 MMOL/L (ref 3.5–5.1)
RBC # BLD AUTO: 5.49 X10(6)UL (ref 4.3–5.7)
SODIUM SERPL-SCNC: 138 MMOL/L (ref 136–145)
TRIGL SERPL-MCNC: 88 MG/DL (ref 30–149)
VLDLC SERPL CALC-MCNC: 18 MG/DL (ref 0–30)
WBC # BLD AUTO: 5.1 X10(3) UL (ref 4–11)

## 2020-02-12 PROCEDURE — 82043 UR ALBUMIN QUANTITATIVE: CPT

## 2020-02-12 PROCEDURE — 85027 COMPLETE CBC AUTOMATED: CPT

## 2020-02-12 PROCEDURE — 82570 ASSAY OF URINE CREATININE: CPT

## 2020-02-12 PROCEDURE — 80061 LIPID PANEL: CPT

## 2020-02-12 PROCEDURE — 80053 COMPREHEN METABOLIC PANEL: CPT

## 2020-02-12 PROCEDURE — 83036 HEMOGLOBIN GLYCOSYLATED A1C: CPT

## 2020-02-12 PROCEDURE — 36415 COLL VENOUS BLD VENIPUNCTURE: CPT

## 2020-02-14 ENCOUNTER — APPOINTMENT (OUTPATIENT)
Dept: LAB | Facility: HOSPITAL | Age: 60
End: 2020-02-14
Attending: INTERNAL MEDICINE
Payer: COMMERCIAL

## 2020-02-14 ENCOUNTER — OFFICE VISIT (OUTPATIENT)
Dept: INTERNAL MEDICINE CLINIC | Facility: CLINIC | Age: 60
End: 2020-02-14
Payer: COMMERCIAL

## 2020-02-14 VITALS
HEART RATE: 93 BPM | SYSTOLIC BLOOD PRESSURE: 118 MMHG | DIASTOLIC BLOOD PRESSURE: 78 MMHG | HEIGHT: 72 IN | BODY MASS INDEX: 24.04 KG/M2 | WEIGHT: 177.5 LBS

## 2020-02-14 DIAGNOSIS — Z00.00 ANNUAL PHYSICAL EXAM: Primary | ICD-10-CM

## 2020-02-14 DIAGNOSIS — E87.6 HYPOKALEMIA: ICD-10-CM

## 2020-02-14 DIAGNOSIS — G47.33 OBSTRUCTIVE SLEEP APNEA: ICD-10-CM

## 2020-02-14 DIAGNOSIS — I10 ESSENTIAL HYPERTENSION: ICD-10-CM

## 2020-02-14 DIAGNOSIS — E78.5 DYSLIPIDEMIA: ICD-10-CM

## 2020-02-14 DIAGNOSIS — E11.9 TYPE 2 DIABETES MELLITUS WITHOUT COMPLICATION, WITHOUT LONG-TERM CURRENT USE OF INSULIN (HCC): ICD-10-CM

## 2020-02-14 LAB
CHLORIDE SERPL-SCNC: 105 MMOL/L (ref 98–112)
CO2 SERPL-SCNC: 31 MMOL/L (ref 21–32)
HAV IGM SER QL: 2 MG/DL (ref 1.6–2.6)
POTASSIUM SERPL-SCNC: 3.3 MMOL/L (ref 3.5–5.1)
SODIUM SERPL-SCNC: 142 MMOL/L (ref 136–145)

## 2020-02-14 PROCEDURE — 83735 ASSAY OF MAGNESIUM: CPT

## 2020-02-14 PROCEDURE — 99396 PREV VISIT EST AGE 40-64: CPT | Performed by: INTERNAL MEDICINE

## 2020-02-14 PROCEDURE — 36415 COLL VENOUS BLD VENIPUNCTURE: CPT

## 2020-02-14 PROCEDURE — 80051 ELECTROLYTE PANEL: CPT

## 2020-02-14 NOTE — H&P
Carlie Reagan is a 64year old male who presents for a complete physical exam.   HPI:   His last physical was February 2019. Recently he has noticed a vague abnormal sensation in both hands, but has a very difficult time describing it.   Symptoms seem wo nightly. 90 tablet 1   • Losartan Potassium-HCTZ 100-25 MG Oral Tab Take 1 tablet by mouth daily.  90 tablet 1   • Glucose Blood (LEXI CONTOUR NEXT TEST) In Vitro Strip Test 3 times per week 100 strip 1   • Lexi Microlet Lancets Does not apply Misc   10 swelling. No foot ulcerations or lesions  NEURO: No headaches.   No numbness or tingling in either foot    EXAM:   GENERAL: Pleasant male appearing well in no distress  /78   Pulse 93   Ht 6' (1.829 m)   Wt 177 lb 8 oz (80.5 kg)   BMI 24.07 kg/m²   H MD  2/14/2020  3:27 PM

## 2020-02-14 NOTE — PATIENT INSTRUCTIONS
Await results of repeat electrolytes. Continue current medication. Continue healthy diet and regular exercise, maintaining current body weight. Return visit in 6 months.

## 2020-03-02 NOTE — TELEPHONE ENCOUNTER
Current Outpatient Medications   Medication Sig Dispense Refill   • metFORMIN HCl 500 MG Oral Tab Take 1 tablet (500 mg total) by mouth 2 (two) times daily with meals.  180 tablet 0

## 2020-03-04 NOTE — TELEPHONE ENCOUNTER
Refill passed per [facility], [facility] protocol.   Diabetes Medications  Protocol Criteria:  · Appointment scheduled in the past 6 months or the next 3 months  · A1C < 7.5 in the past 6 months  · Creatinine in the past 12 months  · Creatinine result < 1.5   Rece

## 2020-05-12 ENCOUNTER — OFFICE VISIT (OUTPATIENT)
Dept: DERMATOLOGY CLINIC | Facility: CLINIC | Age: 60
End: 2020-05-12
Payer: COMMERCIAL

## 2020-05-12 DIAGNOSIS — D23.60 BENIGN NEOPLASM OF SKIN OF UPPER EXTREMITY AND SHOULDER, UNSPECIFIED LATERALITY: ICD-10-CM

## 2020-05-12 DIAGNOSIS — L91.8 SKIN TAG: ICD-10-CM

## 2020-05-12 DIAGNOSIS — D18.01 HEMANGIOMA OF SKIN AND SUBCUTANEOUS TISSUE: ICD-10-CM

## 2020-05-12 DIAGNOSIS — L82.1 SEBORRHEIC KERATOSES: ICD-10-CM

## 2020-05-12 DIAGNOSIS — D23.70 BENIGN NEOPLASM OF SKIN OF LOWER EXTREMITY, INCLUDING HIP, UNSPECIFIED LATERALITY: ICD-10-CM

## 2020-05-12 DIAGNOSIS — D48.5 NEOPLASM OF UNCERTAIN BEHAVIOR OF SKIN: Primary | ICD-10-CM

## 2020-05-12 DIAGNOSIS — D23.4 BENIGN NEOPLASM OF SCALP AND SKIN OF NECK: ICD-10-CM

## 2020-05-12 DIAGNOSIS — D23.5 BENIGN NEOPLASM OF SKIN OF TRUNK, EXCEPT SCROTUM: ICD-10-CM

## 2020-05-12 DIAGNOSIS — D23.30 BENIGN NEOPLASM OF SKIN OF FACE: ICD-10-CM

## 2020-05-12 PROCEDURE — 99202 OFFICE O/P NEW SF 15 MIN: CPT | Performed by: DERMATOLOGY

## 2020-05-12 PROCEDURE — 11200 RMVL SKIN TAGS UP TO&INC 15: CPT | Performed by: DERMATOLOGY

## 2020-05-12 PROCEDURE — 11103 TANGNTL BX SKIN EA SEP/ADDL: CPT | Performed by: DERMATOLOGY

## 2020-05-12 PROCEDURE — 11102 TANGNTL BX SKIN SINGLE LES: CPT | Performed by: DERMATOLOGY

## 2020-05-12 PROCEDURE — 88305 TISSUE EXAM BY PATHOLOGIST: CPT | Performed by: DERMATOLOGY

## 2020-05-12 NOTE — PROCEDURES
Procedural Report for Shave Biopsy    Procedure: With the patient is a r lateral decub position, the skin was scrubbed with alcohol. Anesthesia was obtained by injecting 2 mL of 1% Xylocaine with Epinephrine.   The skin surrounding the lession was place

## 2020-05-12 NOTE — PROGRESS NOTES
HPI:     Chief Complaint     Full Skin Exam        HPI     Full Skin Exam      Additional comments: New Patient present for full body exam . Patient denies any hx of sc          Last edited by Thuy Vee on 5/12/2020  3:45 PM. (History) atherosclerosis ([address]     CT scan 8-17   • Diverticulosis     Colonoscopy 10-12   • Dyslipidemia    • Essential hypertension 2001   • Hepatic steatosis September 2015    Workup (iron studies, hepatitis virus serologies, ANTHONY) negative   • Obstructive sleep Emotionally abused: Not on file        Physically abused: Not on file        Forced sexual activity: Not on file    Other Topics      Concerns:         Service: Not Asked        Blood Transfusions: Not Asked        Caffeine Concern: Yes          co reticular network on dermoscopy.   Cheryl Murrell is also a 4 mm very dark macule at the upper sacral area with large dark blotch on dermoscopy  -there are scattered blotchy brown macules on face, trunk and extremities  - there are 2 larger, presently noninflamed t

## 2020-05-19 RX ORDER — ATORVASTATIN CALCIUM 20 MG/1
TABLET, FILM COATED ORAL
Qty: 90 TABLET | Refills: 1 | Status: SHIPPED | OUTPATIENT
Start: 2020-05-19 | End: 2020-11-02

## 2020-05-21 ENCOUNTER — TELEPHONE (OUTPATIENT)
Dept: INTERNAL MEDICINE CLINIC | Facility: CLINIC | Age: 60
End: 2020-05-21

## 2020-05-21 RX ORDER — LOSARTAN POTASSIUM AND HYDROCHLOROTHIAZIDE 25; 100 MG/1; MG/1
1 TABLET ORAL DAILY
Qty: 90 TABLET | Refills: 1 | Status: SHIPPED | OUTPATIENT
Start: 2020-05-21 | End: 2020-11-02

## 2020-05-21 NOTE — TELEPHONE ENCOUNTER
Prescription renewal Form:    Current Outpatient Medications   Medication Sig Dispense Refill   • Losartan Potassium-HCTZ 100-25 MG Oral Tab Take 1 tablet by mouth daily.  90 tablet 1

## 2020-07-21 RX ORDER — POTASSIUM CHLORIDE 20 MEQ/1
TABLET, EXTENDED RELEASE ORAL
Qty: 90 TABLET | Refills: 1 | Status: SHIPPED | OUTPATIENT
Start: 2020-07-21 | End: 2021-01-11

## 2020-10-24 ENCOUNTER — OFFICE VISIT (OUTPATIENT)
Dept: INTERNAL MEDICINE CLINIC | Facility: CLINIC | Age: 60
End: 2020-10-24
Payer: COMMERCIAL

## 2020-10-24 ENCOUNTER — LAB ENCOUNTER (OUTPATIENT)
Dept: LAB | Facility: HOSPITAL | Age: 60
End: 2020-10-24
Attending: INTERNAL MEDICINE
Payer: COMMERCIAL

## 2020-10-24 VITALS
SYSTOLIC BLOOD PRESSURE: 130 MMHG | WEIGHT: 179 LBS | HEART RATE: 87 BPM | DIASTOLIC BLOOD PRESSURE: 84 MMHG | BODY MASS INDEX: 24.24 KG/M2 | RESPIRATION RATE: 16 BRPM | HEIGHT: 72 IN

## 2020-10-24 DIAGNOSIS — E11.9 TYPE 2 DIABETES MELLITUS WITHOUT COMPLICATION, WITHOUT LONG-TERM CURRENT USE OF INSULIN (HCC): ICD-10-CM

## 2020-10-24 DIAGNOSIS — E11.9 TYPE 2 DIABETES MELLITUS WITHOUT COMPLICATION, WITHOUT LONG-TERM CURRENT USE OF INSULIN (HCC): Primary | ICD-10-CM

## 2020-10-24 DIAGNOSIS — I10 ESSENTIAL HYPERTENSION: ICD-10-CM

## 2020-10-24 PROCEDURE — 83036 HEMOGLOBIN GLYCOSYLATED A1C: CPT

## 2020-10-24 PROCEDURE — 36415 COLL VENOUS BLD VENIPUNCTURE: CPT

## 2020-10-24 PROCEDURE — 99213 OFFICE O/P EST LOW 20 MIN: CPT | Performed by: INTERNAL MEDICINE

## 2020-10-24 PROCEDURE — 3008F BODY MASS INDEX DOCD: CPT | Performed by: INTERNAL MEDICINE

## 2020-10-24 PROCEDURE — 3075F SYST BP GE 130 - 139MM HG: CPT | Performed by: INTERNAL MEDICINE

## 2020-10-24 PROCEDURE — 80048 BASIC METABOLIC PNL TOTAL CA: CPT

## 2020-10-24 PROCEDURE — 3079F DIAST BP 80-89 MM HG: CPT | Performed by: INTERNAL MEDICINE

## 2020-10-24 NOTE — PROGRESS NOTES
Toma Howard is a 64year old male. Patient presents with:  Diabetes    HPI:   William Espinoza presents this morning for follow-up of type 2 diabetes and hypertension. After his physical in February, potassium supplementation was begun. He feels well.   He has bee Tobacco Use      Smoking status: Never Smoker      Smokeless tobacco: Never Used    Alcohol use:  Yes      Alcohol/week: 0.0 standard drinks      Comment: Infrequent    Drug use: No       EXAM:   GENERAL: Pleasant male appearing well in no distress  /

## 2020-10-24 NOTE — PATIENT INSTRUCTIONS
Await today's blood test results. Continue current medications. Continue healthy diet and try to exercise regularly. Physical with complete labs in February.

## 2020-11-02 RX ORDER — LOSARTAN POTASSIUM AND HYDROCHLOROTHIAZIDE 25; 100 MG/1; MG/1
1 TABLET ORAL DAILY
Qty: 90 TABLET | Refills: 1 | Status: SHIPPED | OUTPATIENT
Start: 2020-11-02 | End: 2021-03-12

## 2020-11-02 RX ORDER — ATORVASTATIN CALCIUM 20 MG/1
TABLET, FILM COATED ORAL
Qty: 90 TABLET | Refills: 1 | Status: SHIPPED | OUTPATIENT
Start: 2020-11-02 | End: 2021-03-12

## 2021-01-11 RX ORDER — POTASSIUM CHLORIDE 20 MEQ/1
TABLET, EXTENDED RELEASE ORAL
Qty: 90 TABLET | Refills: 1 | Status: SHIPPED | OUTPATIENT
Start: 2021-01-11 | End: 2021-08-16

## 2021-03-12 ENCOUNTER — OFFICE VISIT (OUTPATIENT)
Dept: INTERNAL MEDICINE CLINIC | Facility: CLINIC | Age: 61
End: 2021-03-12
Payer: COMMERCIAL

## 2021-03-12 VITALS
HEIGHT: 72 IN | WEIGHT: 180.69 LBS | DIASTOLIC BLOOD PRESSURE: 82 MMHG | RESPIRATION RATE: 18 BRPM | HEART RATE: 92 BPM | SYSTOLIC BLOOD PRESSURE: 128 MMHG | BODY MASS INDEX: 24.47 KG/M2

## 2021-03-12 DIAGNOSIS — E11.9 TYPE 2 DIABETES MELLITUS WITHOUT COMPLICATION, WITHOUT LONG-TERM CURRENT USE OF INSULIN (HCC): ICD-10-CM

## 2021-03-12 DIAGNOSIS — Z00.00 ANNUAL PHYSICAL EXAM: Primary | ICD-10-CM

## 2021-03-12 DIAGNOSIS — G47.33 OBSTRUCTIVE SLEEP APNEA: ICD-10-CM

## 2021-03-12 DIAGNOSIS — E78.5 DYSLIPIDEMIA: ICD-10-CM

## 2021-03-12 DIAGNOSIS — I10 ESSENTIAL HYPERTENSION: ICD-10-CM

## 2021-03-12 PROCEDURE — 99396 PREV VISIT EST AGE 40-64: CPT | Performed by: INTERNAL MEDICINE

## 2021-03-12 PROCEDURE — 3008F BODY MASS INDEX DOCD: CPT | Performed by: INTERNAL MEDICINE

## 2021-03-12 PROCEDURE — 3079F DIAST BP 80-89 MM HG: CPT | Performed by: INTERNAL MEDICINE

## 2021-03-12 PROCEDURE — 3074F SYST BP LT 130 MM HG: CPT | Performed by: INTERNAL MEDICINE

## 2021-03-12 RX ORDER — LOSARTAN POTASSIUM AND HYDROCHLOROTHIAZIDE 25; 100 MG/1; MG/1
1 TABLET ORAL DAILY
Qty: 90 TABLET | Refills: 1 | Status: SHIPPED | OUTPATIENT
Start: 2021-03-12 | End: 2021-11-01

## 2021-03-12 RX ORDER — ATORVASTATIN CALCIUM 20 MG/1
20 TABLET, FILM COATED ORAL NIGHTLY
Qty: 90 TABLET | Refills: 1 | Status: SHIPPED | OUTPATIENT
Start: 2021-03-12 | End: 2021-11-01

## 2021-03-12 NOTE — H&P
Pearle Lanes is a 64year old male who presents for a complete physical exam, as well as for follow-up of type 2 diabetes, hypertension and dyslipidemia. HPI:   His last physical was February 2020. Overall he feels well.   He is interested in results of Medical History:   Diagnosis Date   • Aortic atherosclerosis ([employer] [facility] [address]     CT scan 8-17   • Diverticulosis     Colonoscopy 10-12   • Dyslipidemia    • Essential hypertension 2001   • Hepatic steatosis September 2015    Workup (iron studies, hepatitis virus ser Supple without mass or thyromegaly  NODES: No peripheral adenopathy  LUNGS: Resonant to percussion and clear to auscultation  CARDIAC: Rhythm regular S1 S2 normal without murmur or edema  ABDOMEN: Bowel sounds normal soft nontender without mass or hepatosp 1    3. Essential hypertension  Well-controlled. Continue losartan/HCTZ  - Losartan Potassium-HCTZ 100-25 MG Oral Tab; Take 1 tablet by mouth daily. Dispense: 90 tablet; Refill: 1    4. Dyslipidemia  On statin therapy.   Await labs  - atorvastatin 20 MG O

## 2021-03-12 NOTE — PATIENT INSTRUCTIONS
What  was your temp today? - \"I don't have one\"    How did you take your temp? Oral digital    Are you feeling short of breath today?   no      Are you having a cough today? , no cough but sneezing         Are you experiencing weakness today, not weak bu Your physical today was normal and your blood pressure was well controlled at 128/82. Please obtain blood and urine testing soon. Continue current medications. Continue healthy diet and regular exercise. Return visit in 6 months.

## 2021-03-13 ENCOUNTER — LAB ENCOUNTER (OUTPATIENT)
Dept: LAB | Facility: HOSPITAL | Age: 61
End: 2021-03-13
Attending: INTERNAL MEDICINE
Payer: COMMERCIAL

## 2021-03-13 DIAGNOSIS — Z00.00 ANNUAL PHYSICAL EXAM: ICD-10-CM

## 2021-03-13 LAB
ALBUMIN SERPL-MCNC: 4 G/DL (ref 3.4–5)
ALBUMIN/GLOB SERPL: 1 {RATIO} (ref 1–2)
ALP LIVER SERPL-CCNC: 66 U/L
ALT SERPL-CCNC: 73 U/L
ANION GAP SERPL CALC-SCNC: 6 MMOL/L (ref 0–18)
AST SERPL-CCNC: 25 U/L (ref 15–37)
BILIRUB SERPL-MCNC: 1 MG/DL (ref 0.1–2)
BUN BLD-MCNC: 16 MG/DL (ref 7–18)
BUN/CREAT SERPL: 16.2 (ref 10–20)
CALCIUM BLD-MCNC: 9.6 MG/DL (ref 8.5–10.1)
CHLORIDE SERPL-SCNC: 105 MMOL/L (ref 98–112)
CHOLEST SMN-MCNC: 132 MG/DL (ref ?–200)
CO2 SERPL-SCNC: 30 MMOL/L (ref 21–32)
COMPLEXED PSA SERPL-MCNC: 2.08 NG/ML (ref ?–4)
CREAT BLD-MCNC: 0.99 MG/DL
CREAT UR-SCNC: 252 MG/DL
DEPRECATED RDW RBC AUTO: 43 FL (ref 35.1–46.3)
ERYTHROCYTE [DISTWIDTH] IN BLOOD BY AUTOMATED COUNT: 13.4 % (ref 11–15)
EST. AVERAGE GLUCOSE BLD GHB EST-MCNC: 160 MG/DL (ref 68–126)
GLOBULIN PLAS-MCNC: 4 G/DL (ref 2.8–4.4)
GLUCOSE BLD-MCNC: 135 MG/DL (ref 70–99)
HBA1C MFR BLD HPLC: 7.2 % (ref ?–5.7)
HCT VFR BLD AUTO: 50.7 %
HDLC SERPL-MCNC: 45 MG/DL (ref 40–59)
HGB BLD-MCNC: 16.7 G/DL
LDLC SERPL CALC-MCNC: 64 MG/DL (ref ?–100)
M PROTEIN MFR SERPL ELPH: 8 G/DL (ref 6.4–8.2)
MCH RBC QN AUTO: 28.8 PG (ref 26–34)
MCHC RBC AUTO-ENTMCNC: 32.9 G/DL (ref 31–37)
MCV RBC AUTO: 87.6 FL
MICROALBUMIN UR-MCNC: 3.76 MG/DL
MICROALBUMIN/CREAT 24H UR-RTO: 14.9 UG/MG (ref ?–30)
NONHDLC SERPL-MCNC: 87 MG/DL (ref ?–130)
OSMOLALITY SERPL CALC.SUM OF ELEC: 295 MOSM/KG (ref 275–295)
PATIENT FASTING Y/N/NP: YES
PATIENT FASTING Y/N/NP: YES
PLATELET # BLD AUTO: 239 10(3)UL (ref 150–450)
POTASSIUM SERPL-SCNC: 3.5 MMOL/L (ref 3.5–5.1)
RBC # BLD AUTO: 5.79 X10(6)UL
SODIUM SERPL-SCNC: 141 MMOL/L (ref 136–145)
TRIGL SERPL-MCNC: 114 MG/DL (ref 30–149)
VLDLC SERPL CALC-MCNC: 23 MG/DL (ref 0–30)
WBC # BLD AUTO: 5.1 X10(3) UL (ref 4–11)

## 2021-03-13 PROCEDURE — 85027 COMPLETE CBC AUTOMATED: CPT

## 2021-03-13 PROCEDURE — 82043 UR ALBUMIN QUANTITATIVE: CPT

## 2021-03-13 PROCEDURE — 80053 COMPREHEN METABOLIC PANEL: CPT

## 2021-03-13 PROCEDURE — 3061F NEG MICROALBUMINURIA REV: CPT | Performed by: INTERNAL MEDICINE

## 2021-03-13 PROCEDURE — 80061 LIPID PANEL: CPT

## 2021-03-13 PROCEDURE — 36415 COLL VENOUS BLD VENIPUNCTURE: CPT

## 2021-03-13 PROCEDURE — 83036 HEMOGLOBIN GLYCOSYLATED A1C: CPT

## 2021-03-13 PROCEDURE — 3051F HG A1C>EQUAL 7.0%<8.0%: CPT | Performed by: INTERNAL MEDICINE

## 2021-03-13 PROCEDURE — 82570 ASSAY OF URINE CREATININE: CPT

## 2021-03-26 ENCOUNTER — IMMUNIZATION (OUTPATIENT)
Dept: LAB | Age: 61
End: 2021-03-26
Attending: HOSPITALIST
Payer: COMMERCIAL

## 2021-03-26 DIAGNOSIS — Z23 NEED FOR VACCINATION: Primary | ICD-10-CM

## 2021-03-26 PROCEDURE — 0001A SARSCOV2 VAC 30MCG/0.3ML IM: CPT

## 2021-04-16 ENCOUNTER — IMMUNIZATION (OUTPATIENT)
Dept: LAB | Age: 61
End: 2021-04-16
Attending: HOSPITALIST
Payer: COMMERCIAL

## 2021-04-16 DIAGNOSIS — Z23 NEED FOR VACCINATION: Primary | ICD-10-CM

## 2021-04-16 PROCEDURE — 0002A SARSCOV2 VAC 30MCG/0.3ML IM: CPT

## 2021-08-16 RX ORDER — POTASSIUM CHLORIDE 20 MEQ/1
20 TABLET, EXTENDED RELEASE ORAL DAILY
Qty: 90 TABLET | Refills: 3 | Status: SHIPPED | OUTPATIENT
Start: 2021-08-16 | End: 2021-11-01

## 2021-08-16 NOTE — TELEPHONE ENCOUNTER
•  POTASSIUM CHLORIDE ER 20 MEQ Oral Tab CR, TAKE 1 TABLET BY MOUTH DAILY GENERIC EQUIVALENT FOR K-DUR M20, Disp: 90 tablet, Rfl: 1

## 2021-10-29 ENCOUNTER — IMMUNIZATION (OUTPATIENT)
Dept: LAB | Facility: HOSPITAL | Age: 61
End: 2021-10-29
Attending: EMERGENCY MEDICINE
Payer: COMMERCIAL

## 2021-10-29 DIAGNOSIS — Z23 NEED FOR VACCINATION: Primary | ICD-10-CM

## 2021-10-29 PROCEDURE — 0004A SARSCOV2 VAC 30MCG/0.3ML IM: CPT

## 2021-11-01 DIAGNOSIS — E78.5 DYSLIPIDEMIA: ICD-10-CM

## 2021-11-01 DIAGNOSIS — I10 ESSENTIAL HYPERTENSION: ICD-10-CM

## 2021-11-01 RX ORDER — POTASSIUM CHLORIDE 20 MEQ/1
20 TABLET, EXTENDED RELEASE ORAL DAILY
Qty: 90 TABLET | Refills: 0 | Status: SHIPPED | OUTPATIENT
Start: 2021-11-01 | End: 2022-01-08

## 2021-11-01 RX ORDER — ATORVASTATIN CALCIUM 20 MG/1
20 TABLET, FILM COATED ORAL NIGHTLY
Qty: 90 TABLET | Refills: 0 | Status: SHIPPED | OUTPATIENT
Start: 2021-11-01 | End: 2022-01-08

## 2021-11-01 RX ORDER — LOSARTAN POTASSIUM AND HYDROCHLOROTHIAZIDE 25; 100 MG/1; MG/1
1 TABLET ORAL DAILY
Qty: 90 TABLET | Refills: 0 | Status: SHIPPED | OUTPATIENT
Start: 2021-11-01 | End: 2022-02-12

## 2021-11-16 ENCOUNTER — LAB ENCOUNTER (OUTPATIENT)
Dept: LAB | Facility: HOSPITAL | Age: 61
End: 2021-11-16
Attending: INTERNAL MEDICINE
Payer: COMMERCIAL

## 2021-11-16 DIAGNOSIS — E11.9 TYPE 2 DIABETES MELLITUS WITHOUT COMPLICATION, WITHOUT LONG-TERM CURRENT USE OF INSULIN (HCC): ICD-10-CM

## 2021-11-16 PROCEDURE — 83036 HEMOGLOBIN GLYCOSYLATED A1C: CPT

## 2021-11-16 PROCEDURE — 3044F HG A1C LEVEL LT 7.0%: CPT | Performed by: INTERNAL MEDICINE

## 2021-11-16 PROCEDURE — 36415 COLL VENOUS BLD VENIPUNCTURE: CPT

## 2021-11-19 ENCOUNTER — OFFICE VISIT (OUTPATIENT)
Dept: INTERNAL MEDICINE CLINIC | Facility: CLINIC | Age: 61
End: 2021-11-19
Payer: COMMERCIAL

## 2021-11-19 VITALS
WEIGHT: 178 LBS | HEIGHT: 72 IN | DIASTOLIC BLOOD PRESSURE: 76 MMHG | BODY MASS INDEX: 24.11 KG/M2 | HEART RATE: 78 BPM | SYSTOLIC BLOOD PRESSURE: 126 MMHG

## 2021-11-19 DIAGNOSIS — E11.9 TYPE 2 DIABETES MELLITUS WITHOUT COMPLICATION, WITHOUT LONG-TERM CURRENT USE OF INSULIN (HCC): Primary | ICD-10-CM

## 2021-11-19 DIAGNOSIS — I10 ESSENTIAL HYPERTENSION: ICD-10-CM

## 2021-11-19 PROCEDURE — 90686 IIV4 VACC NO PRSV 0.5 ML IM: CPT | Performed by: INTERNAL MEDICINE

## 2021-11-19 PROCEDURE — 90471 IMMUNIZATION ADMIN: CPT | Performed by: INTERNAL MEDICINE

## 2021-11-19 PROCEDURE — 3078F DIAST BP <80 MM HG: CPT | Performed by: INTERNAL MEDICINE

## 2021-11-19 PROCEDURE — 99214 OFFICE O/P EST MOD 30 MIN: CPT | Performed by: INTERNAL MEDICINE

## 2021-11-19 PROCEDURE — 3008F BODY MASS INDEX DOCD: CPT | Performed by: INTERNAL MEDICINE

## 2021-11-19 PROCEDURE — 3074F SYST BP LT 130 MM HG: CPT | Performed by: INTERNAL MEDICINE

## 2021-11-19 PROCEDURE — 90732 PPSV23 VACC 2 YRS+ SUBQ/IM: CPT | Performed by: INTERNAL MEDICINE

## 2021-11-19 PROCEDURE — 90472 IMMUNIZATION ADMIN EACH ADD: CPT | Performed by: INTERNAL MEDICINE

## 2021-11-19 NOTE — PROGRESS NOTES
Curtis Scott is a 63year old male. Patient presents with: Follow - Up    HPI:   Ubaldo Collins presents this afternoon for 6-month follow-up of type 2 diabetes and hypertension. His last visit was in March for his physical.    He feels well.   Accu-Cheks range bet Social History:  Social History    Tobacco Use      Smoking status: Never Smoker      Smokeless tobacco: Never Used    Vaping Use      Vaping Use: Never used    Alcohol use:  Yes      Alcohol/week: 0.0 standard drinks      Comment: Infrequent    Drug use:

## 2021-11-19 NOTE — PATIENT INSTRUCTIONS
Sandra Sanchez, your diabetes is doing great with recent glycohemoglobin 6.7%. Your blood pressure today was excellent at 126/76. You received Pneumovax and a flu shot today. Continue current medication. Continue healthy diet and regular physical activity.   Zev

## 2022-01-08 DIAGNOSIS — E78.5 DYSLIPIDEMIA: ICD-10-CM

## 2022-01-09 RX ORDER — POTASSIUM CHLORIDE 20 MEQ/1
20 TABLET, EXTENDED RELEASE ORAL DAILY
Qty: 90 TABLET | Refills: 1 | Status: SHIPPED | OUTPATIENT
Start: 2022-01-09

## 2022-01-09 RX ORDER — ATORVASTATIN CALCIUM 20 MG/1
20 TABLET, FILM COATED ORAL NIGHTLY
Qty: 90 TABLET | Refills: 1 | Status: SHIPPED | OUTPATIENT
Start: 2022-01-09

## 2022-01-09 NOTE — TELEPHONE ENCOUNTER
Please review. Protocol failed or does not have a protocol. Requested Prescriptions   Pending Prescriptions Disp Refills    potassium chloride 20 MEQ Oral Tab CR 90 tablet 0     Sig: Take 1 tablet (20 mEq total) by mouth daily.         There is no refil

## 2022-02-14 RX ORDER — LOSARTAN POTASSIUM AND HYDROCHLOROTHIAZIDE 25; 100 MG/1; MG/1
1 TABLET ORAL DAILY
Qty: 90 TABLET | Refills: 1 | Status: SHIPPED | OUTPATIENT
Start: 2022-02-14

## 2022-02-14 NOTE — TELEPHONE ENCOUNTER
Refill passed per [address] [facility] protocol. Requested Prescriptions   Pending Prescriptions Disp Refills    losartan-hydroCHLOROthiazide 100-25 MG Oral Tab 90 tablet 0     Sig: Take 1 tablet by mouth daily.         Hypertensive Medications Protocol Passed - 2/12/2022  7:49 PM        Passed - CMP or BMP in past 12 months        Passed - Appointment in past 6 or next 3 months        Passed - GFR Non- > 50     Lab Results   Component Value Date    GFRNAA 84 03/13/2021                      Recent Outpatient Visits              2 months ago Type 2 diabetes mellitus without complication, without long-term current use of insulin [facility])    Carl Conquest, Arminda Fabry, MD    Office Visit    11 months ago Annual physical exam    Paul Mg MD    Office Visit    1 year ago Type 2 diabetes mellitus without complication, without long-term current use of insulin [facility])    [address] [facility], [address], Arminda Fabry, MD    Office Visit    1 year ago Neoplasm of uncertain behavior of skin    [facility] Sheba Bruno MD    Office Visit    2 years ago Annual physical exam    Carl Conquest, Arminda Fabry, MD    Office Visit

## 2022-04-26 NOTE — TELEPHONE ENCOUNTER
Please review; protocol failed/no protocol. Requested Prescriptions   Pending Prescriptions Disp Refills    metFORMIN HCl 1000 MG Oral Tab 180 tablet 1     Sig: Take 1 tablet (1,000 mg total) by mouth 2 (two) times daily with meals.         Diabetes Medication Protocol Failed - 4/24/2022  8:53 PM        Failed - GFR in the past 12 months        Passed - Last A1C < 7.5 and within past 6 months     Lab Results   Component Value Date    A1C 6.7 (H) 11/16/2021               Passed - Appointment in past 6 or next 3 months        Passed - GFR Non- > 50     Lab Results   Component Value Date    GFRNAA 84 03/13/2021                        Recent Outpatient Visits              5 months ago Type 2 diabetes mellitus without complication, without long-term current use of insulin [facility])    [facility], [address], Faviola Robison MD    Office Visit    1 year ago Annual physical exam    Ellene Sacks, MD    Office Visit    1 year ago Type 2 diabetes mellitus without complication, without long-term current use of insulin [facility])    [facility], [address], Faviola Robison MD    Office Visit    1 year ago Neoplasm of uncertain behavior of skin    [facility] Tiarra Case MD    Office Visit    2 years ago Annual physical exam    Faviola Goldsmith MD    Office Visit

## 2022-04-26 NOTE — TELEPHONE ENCOUNTER
1st attempt - Lingvistt message sent for patient to contact the office to schedule an appointment; see notes below.

## 2022-05-13 ENCOUNTER — OFFICE VISIT (OUTPATIENT)
Dept: INTERNAL MEDICINE CLINIC | Facility: CLINIC | Age: 62
End: 2022-05-13
Payer: COMMERCIAL

## 2022-05-13 ENCOUNTER — LAB ENCOUNTER (OUTPATIENT)
Dept: LAB | Facility: HOSPITAL | Age: 62
End: 2022-05-13
Attending: INTERNAL MEDICINE
Payer: COMMERCIAL

## 2022-05-13 VITALS
BODY MASS INDEX: 24.11 KG/M2 | DIASTOLIC BLOOD PRESSURE: 80 MMHG | SYSTOLIC BLOOD PRESSURE: 118 MMHG | HEART RATE: 109 BPM | HEIGHT: 72 IN | WEIGHT: 178 LBS

## 2022-05-13 DIAGNOSIS — I10 ESSENTIAL HYPERTENSION: ICD-10-CM

## 2022-05-13 DIAGNOSIS — E78.5 DYSLIPIDEMIA ASSOCIATED WITH TYPE 2 DIABETES MELLITUS (HCC): ICD-10-CM

## 2022-05-13 DIAGNOSIS — Z00.00 ANNUAL PHYSICAL EXAM: Primary | ICD-10-CM

## 2022-05-13 DIAGNOSIS — E11.9 TYPE 2 DIABETES MELLITUS WITHOUT COMPLICATION, WITHOUT LONG-TERM CURRENT USE OF INSULIN (HCC): ICD-10-CM

## 2022-05-13 DIAGNOSIS — E11.69 DYSLIPIDEMIA ASSOCIATED WITH TYPE 2 DIABETES MELLITUS (HCC): ICD-10-CM

## 2022-05-13 DIAGNOSIS — Z00.00 ANNUAL PHYSICAL EXAM: ICD-10-CM

## 2022-05-13 DIAGNOSIS — G47.33 OBSTRUCTIVE SLEEP APNEA: ICD-10-CM

## 2022-05-13 LAB
ALBUMIN SERPL-MCNC: 3.9 G/DL (ref 3.4–5)
ALBUMIN/GLOB SERPL: 1 {RATIO} (ref 1–2)
ALP LIVER SERPL-CCNC: 54 U/L
ALT SERPL-CCNC: 74 U/L
ANION GAP SERPL CALC-SCNC: 5 MMOL/L (ref 0–18)
AST SERPL-CCNC: 28 U/L (ref 15–37)
BILIRUB SERPL-MCNC: 1.1 MG/DL (ref 0.1–2)
BUN BLD-MCNC: 15 MG/DL (ref 7–18)
BUN/CREAT SERPL: 14.4 (ref 10–20)
CALCIUM BLD-MCNC: 9.2 MG/DL (ref 8.5–10.1)
CHLORIDE SERPL-SCNC: 104 MMOL/L (ref 98–112)
CHOLEST SERPL-MCNC: 105 MG/DL (ref ?–200)
CO2 SERPL-SCNC: 29 MMOL/L (ref 21–32)
COMPLEXED PSA SERPL-MCNC: 1.97 NG/ML (ref ?–4)
CREAT BLD-MCNC: 1.04 MG/DL
CREAT UR-SCNC: 138 MG/DL
DEPRECATED RDW RBC AUTO: 44 FL (ref 35.1–46.3)
ERYTHROCYTE [DISTWIDTH] IN BLOOD BY AUTOMATED COUNT: 13.7 % (ref 11–15)
EST. AVERAGE GLUCOSE BLD GHB EST-MCNC: 154 MG/DL (ref 68–126)
FASTING PATIENT LIPID ANSWER: YES
FASTING STATUS PATIENT QL REPORTED: YES
GLOBULIN PLAS-MCNC: 4.1 G/DL (ref 2.8–4.4)
GLUCOSE BLD-MCNC: 127 MG/DL (ref 70–99)
HBA1C MFR BLD: 7 % (ref ?–5.7)
HCT VFR BLD AUTO: 47.7 %
HDLC SERPL-MCNC: 43 MG/DL (ref 40–59)
HGB BLD-MCNC: 15.7 G/DL
LDLC SERPL CALC-MCNC: 45 MG/DL (ref ?–100)
MCH RBC QN AUTO: 28.9 PG (ref 26–34)
MCHC RBC AUTO-ENTMCNC: 32.9 G/DL (ref 31–37)
MCV RBC AUTO: 87.8 FL
MICROALBUMIN UR-MCNC: 2.12 MG/DL
MICROALBUMIN/CREAT 24H UR-RTO: 15.4 UG/MG (ref ?–30)
NONHDLC SERPL-MCNC: 62 MG/DL (ref ?–130)
OSMOLALITY SERPL CALC.SUM OF ELEC: 288 MOSM/KG (ref 275–295)
PLATELET # BLD AUTO: 256 10(3)UL (ref 150–450)
POTASSIUM SERPL-SCNC: 3.5 MMOL/L (ref 3.5–5.1)
PROT SERPL-MCNC: 8 G/DL (ref 6.4–8.2)
RBC # BLD AUTO: 5.43 X10(6)UL
SODIUM SERPL-SCNC: 138 MMOL/L (ref 136–145)
TRIGL SERPL-MCNC: 86 MG/DL (ref 30–149)
VLDLC SERPL CALC-MCNC: 12 MG/DL (ref 0–30)
WBC # BLD AUTO: 5.7 X10(3) UL (ref 4–11)

## 2022-05-13 PROCEDURE — 3051F HG A1C>EQUAL 7.0%<8.0%: CPT | Performed by: INTERNAL MEDICINE

## 2022-05-13 PROCEDURE — 3061F NEG MICROALBUMINURIA REV: CPT | Performed by: INTERNAL MEDICINE

## 2022-05-13 PROCEDURE — 3079F DIAST BP 80-89 MM HG: CPT | Performed by: INTERNAL MEDICINE

## 2022-05-13 PROCEDURE — 82043 UR ALBUMIN QUANTITATIVE: CPT

## 2022-05-13 PROCEDURE — 80061 LIPID PANEL: CPT

## 2022-05-13 PROCEDURE — 3008F BODY MASS INDEX DOCD: CPT | Performed by: INTERNAL MEDICINE

## 2022-05-13 PROCEDURE — 83036 HEMOGLOBIN GLYCOSYLATED A1C: CPT

## 2022-05-13 PROCEDURE — 82570 ASSAY OF URINE CREATININE: CPT

## 2022-05-13 PROCEDURE — 99396 PREV VISIT EST AGE 40-64: CPT | Performed by: INTERNAL MEDICINE

## 2022-05-13 PROCEDURE — 3074F SYST BP LT 130 MM HG: CPT | Performed by: INTERNAL MEDICINE

## 2022-05-13 PROCEDURE — 85027 COMPLETE CBC AUTOMATED: CPT

## 2022-05-13 PROCEDURE — 36415 COLL VENOUS BLD VENIPUNCTURE: CPT

## 2022-05-13 PROCEDURE — 80053 COMPREHEN METABOLIC PANEL: CPT

## 2022-05-13 NOTE — PATIENT INSTRUCTIONS
Your blood pressure today was excellent at 118/80 and your exam was normal.  Await results of blood and urine testing. Please continue current medications. Continue healthy diet and regular walking. Please get a second COVID booster soon. You will be due for colonoscopy this fall. Await eye exam in July. Return visit in 6 months.

## 2022-06-12 DIAGNOSIS — E78.5 DYSLIPIDEMIA: ICD-10-CM

## 2022-06-12 RX ORDER — ATORVASTATIN CALCIUM 20 MG/1
20 TABLET, FILM COATED ORAL NIGHTLY
Qty: 90 TABLET | Refills: 1 | Status: SHIPPED | OUTPATIENT
Start: 2022-06-12 | End: 2022-11-14

## 2022-06-12 NOTE — TELEPHONE ENCOUNTER
Refill passed per [facility] protocol.     Requested Prescriptions   Pending Prescriptions Disp Refills    ATORVASTATIN 20 MG Oral Tab [Pharmacy Med Name: ATORVASTATIN TABS 20MG] 90 tablet 3     Sig: TAKE 1 TABLET NIGHTLY        Cholesterol Medication Protocol Passed - 6/12/2022  9:11 AM        Passed - ALT in past 12 months        Passed - LDL in past 12 months        Passed - Last ALT < 80       Lab Results   Component Value Date    ALT 74 (H) 05/13/2022             Passed - Last LDL < 130     Lab Results   Component Value Date    LDL 45 05/13/2022               Passed - Appointment in past 12 or next 3 months              Recent Outpatient Visits              1 month ago Annual physical exam    Checo Nur MD    Office Visit    6 months ago Type 2 diabetes mellitus without complication, without long-term current use of insulin [facility])    [facility], [address], Binta Robbins MD    Office Visit    1 year ago Annual physical exam    Checo Nur MD    Office Visit    1 year ago Type 2 diabetes mellitus without complication, without long-term current use of insulin [facility])    [facility], [address], Binta Robbins MD    Office Visit    2 years ago Neoplasm of uncertain behavior of skin    [address] [facility] Sulema Guzman MD    Office Visit

## 2022-08-07 DIAGNOSIS — I10 ESSENTIAL HYPERTENSION: ICD-10-CM

## 2022-08-07 RX ORDER — LOSARTAN POTASSIUM AND HYDROCHLOROTHIAZIDE 25; 100 MG/1; MG/1
TABLET ORAL
Qty: 90 TABLET | Refills: 3 | Status: SHIPPED | OUTPATIENT
Start: 2022-08-07

## 2022-11-04 ENCOUNTER — LAB ENCOUNTER (OUTPATIENT)
Dept: LAB | Facility: HOSPITAL | Age: 62
End: 2022-11-04
Attending: INTERNAL MEDICINE
Payer: COMMERCIAL

## 2022-11-04 DIAGNOSIS — E11.9 TYPE 2 DIABETES MELLITUS WITHOUT COMPLICATION, WITHOUT LONG-TERM CURRENT USE OF INSULIN (HCC): ICD-10-CM

## 2022-11-04 LAB
EST. AVERAGE GLUCOSE BLD GHB EST-MCNC: 146 MG/DL (ref 68–126)
HBA1C MFR BLD: 6.7 % (ref ?–5.7)

## 2022-11-04 PROCEDURE — 83036 HEMOGLOBIN GLYCOSYLATED A1C: CPT

## 2022-11-04 PROCEDURE — 3044F HG A1C LEVEL LT 7.0%: CPT | Performed by: INTERNAL MEDICINE

## 2022-11-04 PROCEDURE — 36415 COLL VENOUS BLD VENIPUNCTURE: CPT

## 2022-11-11 ENCOUNTER — OFFICE VISIT (OUTPATIENT)
Dept: INTERNAL MEDICINE CLINIC | Facility: CLINIC | Age: 62
End: 2022-11-11
Payer: COMMERCIAL

## 2022-11-11 VITALS
DIASTOLIC BLOOD PRESSURE: 72 MMHG | WEIGHT: 179.81 LBS | BODY MASS INDEX: 24.35 KG/M2 | SYSTOLIC BLOOD PRESSURE: 128 MMHG | HEART RATE: 90 BPM | HEIGHT: 72 IN

## 2022-11-11 DIAGNOSIS — E11.9 TYPE 2 DIABETES MELLITUS WITHOUT COMPLICATION, WITHOUT LONG-TERM CURRENT USE OF INSULIN (HCC): Primary | ICD-10-CM

## 2022-11-11 DIAGNOSIS — I10 ESSENTIAL HYPERTENSION: ICD-10-CM

## 2022-11-11 DIAGNOSIS — Z12.11 COLON CANCER SCREENING: ICD-10-CM

## 2022-11-11 PROCEDURE — 3078F DIAST BP <80 MM HG: CPT | Performed by: INTERNAL MEDICINE

## 2022-11-11 PROCEDURE — 99214 OFFICE O/P EST MOD 30 MIN: CPT | Performed by: INTERNAL MEDICINE

## 2022-11-11 PROCEDURE — 3074F SYST BP LT 130 MM HG: CPT | Performed by: INTERNAL MEDICINE

## 2022-11-11 PROCEDURE — 3008F BODY MASS INDEX DOCD: CPT | Performed by: INTERNAL MEDICINE

## 2022-11-11 NOTE — PATIENT INSTRUCTIONS
Your blood pressure today was excellent at 128/72, and recent glycohemoglobin was excellent at 6.7%. Please continue current medications, healthy diet and regular walking. Please schedule a physical in 6 months. Please contact GI to arrange colonoscopy.

## 2022-11-14 DIAGNOSIS — E78.5 DYSLIPIDEMIA: ICD-10-CM

## 2022-11-14 RX ORDER — POTASSIUM CHLORIDE 1500 MG/1
TABLET, EXTENDED RELEASE ORAL
Qty: 90 TABLET | Refills: 3 | Status: SHIPPED | OUTPATIENT
Start: 2022-11-14

## 2022-11-15 RX ORDER — ATORVASTATIN CALCIUM 20 MG/1
20 TABLET, FILM COATED ORAL NIGHTLY
Qty: 90 TABLET | Refills: 1 | Status: SHIPPED | OUTPATIENT
Start: 2022-11-15

## 2022-11-15 NOTE — TELEPHONE ENCOUNTER
Refill passed per [address] [place] [address] protocol. Requested Prescriptions   Pending Prescriptions Disp Refills    atorvastatin 20 MG Oral Tab 90 tablet 1     Sig: Take 1 tablet (20 mg total) by mouth nightly.        Cholesterol Medication Protocol Passed - 11/14/2022  8:53 PM        Passed - ALT in past 12 months        Passed - LDL in past 12 months        Passed - Last ALT < 80     Lab Results   Component Value Date    ALT 74 (H) 05/13/2022             Passed - Last LDL < 130     Lab Results   Component Value Date    LDL 45 05/13/2022             Passed - In person appointment or virtual visit in the past 12 mos or appointment in next 3 mos     Recent Outpatient Visits              4 days ago Type 2 diabetes mellitus without complication, without long-term current use of insulin [facility])    Yazmin Andrea MD    Office Visit    6 months ago Annual physical exam    [address] [place] [address], Hernando Olmedo MD    Office Visit    12 months ago Type 2 diabetes mellitus without complication, without long-term current use of insulin [facility])    [address] [place] [address] [place], [address], Hernando Olmedo MD    Office Visit    1 year ago Annual physical exam    Lavinia Lpoez MD    Office Visit    2 years ago Type 2 diabetes mellitus without complication, without long-term current use of insulin [facility])    [facility] [place], [address], Hernando Olmedo MD    Office Visit                                 Recent Outpatient Visits              4 days ago Type 2 diabetes mellitus without complication, without long-term current use of insulin [facility])    Yazmin Andrea MD    Office Visit    6 months ago Annual physical exam    Lavinia Lopez MD    Office Visit    12 months ago Type 2 diabetes mellitus without complication, without long-term current use of insulin [facility])    [place], [place], Binta Robbins MD    Office Visit    1 year ago Annual physical exam    Checo Nur MD    Office Visit    2 years ago Type 2 diabetes mellitus without complication, without long-term current use of insulin [facility])    [place], [place], Binta Robbins MD    Office Visit

## 2022-12-12 NOTE — PROGRESS NOTES
Operative Report  Paz Campuzano MD (Physician) [facility]  Unsigned  Patient:   Susy TELLES #:   [id number]                    Room: [facility]  Dieter MARTINEZ #:  [id number]     ADMITTED:   10/26/2012        DATE OF PROCEDURE:  10/22/2012     PROCEDURE:  Colonoscopy. SURGEON:  Francie Davis MD.     ATTENDING PHYSICIAN:  Janis Reagan. Stephon Rodríguez MD.     PREOPERATIVE DIAGNOSIS:  Colorectal cancer screening. POSTOPERATIVE DIAGNOSES:  1. Few diverticula, uncomplicated. 2. Small internal hemorrhoids. BRIEF HISTORY:  This is a 51-year-old male who presented for   colorectal  cancer screening. Informed consent was obtained after explanation   of the  risks, benefits, and alternatives of the procedure to the patient. PREOPERATIVE SEDATION:  None per patient request.  No sedation   given. FINDINGS AND TECHNIQUE: The patient was placed in the left lateral  decubitus position. Then, a rectal digital examination was   performed  revealing a normal sphincter tone and no masses. The colonoscope   was then  inserted and advanced without difficulty to the terminal ileum and   then  slowly withdrawn. The entire colon was carefully examined. Findings were  as follows. FINDINGS:  There were two diverticula identified in the transverse   and  descending colon without inflammatory changes. There were no mass   lesions,  polyps, AVMs, or mucosal abnormalities identified. Retroflexion   in the  rectum was normal.  Withdrawal of the scope from the anal canal   revealed  very small internal hemorrhoids. The patient tolerated the   procedure well  without immediate complication. IMPRESSION:  1. Diverticular disease, uncomplicated. 2. Internal hemorrhoids. RECOMMENDATION:  Next screening colonoscopy in 10 years unless   other signs  or symptoms develop in the interim.            D:    10/26/2012 9:47 A  T:    10/27/2012  8:02 P  ATTENDING:  Francie Davis MD [id number]  DICTATING: Becka Luther MD [id number]  MD ID #:      [id number]  cc:   Becka Luther MD [id number]        JOB NUMBER:         [id number]  [facility] #: [id number]  MR #: [id number]                  Patient: Liu Ramsey

## 2022-12-13 ENCOUNTER — NURSE ONLY (OUTPATIENT)
Facility: CLINIC | Age: 62
End: 2022-12-13

## 2022-12-13 DIAGNOSIS — Z80.0 FH: COLON CANCER: ICD-10-CM

## 2022-12-13 DIAGNOSIS — Z12.11 COLON CANCER SCREENING: Primary | ICD-10-CM

## 2022-12-13 NOTE — PROGRESS NOTES
Dr. Essence Medina:  Per prior procedure report below he had with no sedation last time as well. Thank you    Schedulers:  Please see below message and orders from Dr. Essence Medina and assist with scheduling procedure.     Thank you

## 2022-12-13 NOTE — PROGRESS NOTES
Hi Dr. Salma Kimbrough,     Per patients request- would it be ok to schedule cln w/ no sedation? Please advise-    Thanks!

## 2022-12-13 NOTE — PROGRESS NOTES
Colonoscopy - colon cancer screening/family history of colon cancer  Golytely   MAC   Hold metformin day before   Hold losartan day of procedure

## 2022-12-16 NOTE — PROGRESS NOTES
I contacted patient and was able to schedule CLN procedure. Patient agreed to MyChart instructions  Patient requested no sedation    Scheduled for: Colonoscopy [address]22    Provider Name: Dr Maximino Hayes  Date: Fri 3/10/2023    Location: [address] [place] [address] 1    Sedation: MAC   Time: 7:30 am   Prep: split golytely   Meds/Allergies Reconciled?: reviewed by provider    Diagnosis with codes: CRC screening Z12.11   Was patient informed to call insurance with codes (Y/N):  Yes  Referral sent?: Yes   [facility] Th  notified?: I sent an electronic request to Endo Scheduling and received a confirmation today. Medication Orders:  Pt is aware to NOT take iron pills, herbal meds and diet supplements for 7 days before exam. Also to NOT take any form of alcohol, recreational drugs and any forms of ED meds 24 hours before exam.       Misc Orders: Patient was informed that they will need a COVID 19 test prior to their procedure. Patient verbally understood & will await a phone call from PAT to schedule. Further instructions given by staff:  Instructions given and pt verbalized understanding

## 2023-03-07 RX ORDER — MULTIVIT-MIN/IRON FUM/FOLIC AC 7.5 MG-4
1 TABLET ORAL DAILY
COMMUNITY

## 2023-03-10 ENCOUNTER — HOSPITAL ENCOUNTER (OUTPATIENT)
Age: 63
Setting detail: HOSPITAL OUTPATIENT SURGERY
Discharge: HOME | End: 2023-03-10
Attending: INTERNAL MEDICINE | Admitting: INTERNAL MEDICINE
Payer: COMMERCIAL

## 2023-03-10 VITALS
DIASTOLIC BLOOD PRESSURE: 90 MMHG | HEIGHT: 72 IN | RESPIRATION RATE: 14 BRPM | HEART RATE: 83 BPM | WEIGHT: 179 LBS | SYSTOLIC BLOOD PRESSURE: 130 MMHG | BODY MASS INDEX: 24.24 KG/M2 | OXYGEN SATURATION: 99 % | TEMPERATURE: 97 F

## 2023-03-10 DIAGNOSIS — Z80.0 FH: COLON CANCER: ICD-10-CM

## 2023-03-10 DIAGNOSIS — Z12.11 COLON CANCER SCREENING: ICD-10-CM

## 2023-03-10 LAB — GLUCOSE BLDC GLUCOMTR-MCNC: 123 MG/DL (ref 70–99)

## 2023-03-10 PROCEDURE — 99070 SPECIAL SUPPLIES PHYS/QHP: CPT | Performed by: INTERNAL MEDICINE

## 2023-03-10 PROCEDURE — 45380 COLONOSCOPY AND BIOPSY: CPT | Performed by: INTERNAL MEDICINE

## 2023-03-10 RX ORDER — SODIUM CHLORIDE 0.9 % (FLUSH) 0.9 %
10 SYRINGE (ML) INJECTION AS NEEDED
Status: DISCONTINUED | OUTPATIENT
Start: 2023-03-10 | End: 2023-03-10

## 2023-03-10 RX ORDER — MIDAZOLAM HYDROCHLORIDE 1 MG/ML
1 INJECTION INTRAMUSCULAR; INTRAVENOUS EVERY 5 MIN PRN
Status: DISCONTINUED | OUTPATIENT
Start: 2023-03-10 | End: 2023-03-10

## 2023-03-10 RX ORDER — SODIUM CHLORIDE, SODIUM LACTATE, POTASSIUM CHLORIDE, CALCIUM CHLORIDE 600; 310; 30; 20 MG/100ML; MG/100ML; MG/100ML; MG/100ML
INJECTION, SOLUTION INTRAVENOUS CONTINUOUS
Status: DISCONTINUED | OUTPATIENT
Start: 2023-03-10 | End: 2023-03-10

## 2023-03-10 NOTE — OPERATIVE REPORT
[facility] Endoscopy Report      Preoperative Diagnosis:  - colon cancer screening  - family history of colon cancer       Postoperative Diagnosis:  - colon polyps x 3  - diverticulosis   - small internal hemorrhoids      Procedure:    Colonoscopy       Surgeon:  Bianca Cochran M.D. Anesthesia:  MAC sedation     Technique:  After informed consent, the patient was placed in the left lateral recumbent position. Digital rectal examination revealed no palpable intraluminal abnormalities. An Olympus variable stiffness 190 series HD colonoscope was inserted into the rectum and advanced under direct vision by following the lumen to the cecum. The colon was examined upon withdrawal in the left lateral position. The procedure was well tolerated without immediate complication. Findings:  The preparation of the colon was good. The terminal ileum was examined for 4 cm and visually normal.  The ileocecal valve was well preserved. The visualized colonic mucosa from the cecum to the anal verge was normal with an intact vascular pattern. Colon polyps x3 removed as follows;  -Cecum x2, both polyps were diminutive and removed by cold forceps technique.  -Ascending x1, diminutive removed by cold forceps technique. All polypectomy sites were inspected and found to be free of bleeding and specimens retrieved and sent for analysis. Scattered diverticula noted in the sigmoid colon, no evidence of diverticulitis. Small internal hemorrhoids noted on retroflexed view. Estimated blood loss-insignificant  -Specimens-colon polyps      Impression:  - colon polyps x 3  - diverticulosis   - small internal hemorrhoids    Recommendations:  - Post polypectomy instructions given  - Repeat colonoscopy in 3- 5 years  - High fiber diet for diverticular disease  - Symptomatic treatment of hemorrhoids          Phong Santos MD  3/10/2023  7:54 AM

## 2023-03-10 NOTE — DISCHARGE INSTRUCTIONS
Home Care Instructions for Colonoscopy and/or Gastroscopy with Sedation    Diet:  - Resume your regular diet as tolerated unless otherwise instructed. - Start with light meals to minimize bloating.  - Do not drink alcohol today. Medication:  - If you have questions about resuming your normal medications, please contact your Primary Care Physician. Activities:  - Take it easy today. Do not return to work today. - Do not drive today. - Do not operate any machinery today (including kitchen equipment). -     Don't sign any legal documents or make critical decisions. Colonoscopy:  - You may notice some rectal \"spotting\" (a little blood on the toilet tissue) for a day or two after the exam. This is normal.  - If you experience any rectal bleeding (not spotting), persistent tenderness or sharp severe abdominal pains, oral temperature over 100 degrees Fahrenheit, light-headedness or dizziness, or any other problems, contact your doctor. or.    **If unable to reach your doctor, please go to the [facility] Emergency Room**    - Your referring physician will receive a full report of your examination.  - If you do not hear from your doctor's office within two weeks of your biopsy, please call them for your results.

## 2023-03-13 PROBLEM — Z86.0101 HX OF ADENOMATOUS COLONIC POLYPS: Status: ACTIVE | Noted: 2023-03-01

## 2023-03-13 PROBLEM — Z86.010 HX OF ADENOMATOUS COLONIC POLYPS: Status: ACTIVE | Noted: 2023-03-01

## 2023-03-15 ENCOUNTER — TELEPHONE (OUTPATIENT)
Facility: CLINIC | Age: 63
End: 2023-03-15

## 2023-03-15 ENCOUNTER — MED REC SCAN ONLY (OUTPATIENT)
Dept: GASTROENTEROLOGY | Facility: CLINIC | Age: 63
End: 2023-03-15

## 2023-03-15 NOTE — TELEPHONE ENCOUNTER
Health Maintenance Updated. 5 year colonoscopy recall entered into patient outreach in [facility]. Next colonoscopy will be due 3/10/2028. Result letter generated, released to [employer], printed, and mailed to patient's home address.

## 2023-03-15 NOTE — TELEPHONE ENCOUNTER
----- Message from Griselda Guevara MD sent at 3/15/2023  4:23 PM CDT -----  I wanted to get back to you with your colonoscopy results. You had 3 colon polyps removed which were benign. I would advise a repeat colonoscopy in 5 years to make sure no new polyps are forming. You also have internal hemorrhoids and diverticulosis. Please stay on a high fiber diet and call with any questions.

## 2023-06-08 ENCOUNTER — NURSE TRIAGE (OUTPATIENT)
Dept: INTERNAL MEDICINE CLINIC | Facility: CLINIC | Age: 63
End: 2023-06-08

## 2023-06-08 NOTE — TELEPHONE ENCOUNTER
He should clean the area well with soap and water and watch closely for signs of infection-fever, increasing pain, surrounding redness, discharge

## 2023-06-09 ENCOUNTER — OFFICE VISIT (OUTPATIENT)
Dept: INTERNAL MEDICINE CLINIC | Facility: CLINIC | Age: 63
End: 2023-06-09

## 2023-06-09 VITALS
DIASTOLIC BLOOD PRESSURE: 80 MMHG | WEIGHT: 182.19 LBS | HEIGHT: 72 IN | BODY MASS INDEX: 24.68 KG/M2 | SYSTOLIC BLOOD PRESSURE: 127 MMHG | HEART RATE: 80 BPM

## 2023-06-09 DIAGNOSIS — S71.151A DOG BITE OF RIGHT THIGH, INITIAL ENCOUNTER: Primary | ICD-10-CM
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
## 2023-06-09 PROCEDURE — 3008F BODY MASS INDEX DOCD: CPT | Performed by: INTERNAL MEDICINE

## 2023-06-09 PROCEDURE — 99213 OFFICE O/P EST LOW 20 MIN: CPT | Performed by: INTERNAL MEDICINE

## 2023-06-09 PROCEDURE — 3074F SYST BP LT 130 MM HG: CPT | Performed by: INTERNAL MEDICINE

## 2023-06-09 PROCEDURE — 3079F DIAST BP 80-89 MM HG: CPT | Performed by: INTERNAL MEDICINE

## 2023-06-09 NOTE — PATIENT INSTRUCTIONS
Your dog bite wounds seem to be healing well without evidence of infection. Please contact me if fever, increasing pain, redness, swelling or purulent drainage occurs. Please schedule a physical soon.

## 2023-06-10 DIAGNOSIS — I10 ESSENTIAL HYPERTENSION: ICD-10-CM

## 2023-06-10 DIAGNOSIS — E78.5 DYSLIPIDEMIA: ICD-10-CM

## 2023-06-10 RX ORDER — LOSARTAN POTASSIUM AND HYDROCHLOROTHIAZIDE 25; 100 MG/1; MG/1
1 TABLET ORAL DAILY
Qty: 90 TABLET | Refills: 0 | Status: SHIPPED | OUTPATIENT
Start: 2023-06-10

## 2023-06-10 RX ORDER — ATORVASTATIN CALCIUM 20 MG/1
20 TABLET, FILM COATED ORAL NIGHTLY
Qty: 90 TABLET | Refills: 0 | Status: SHIPPED | OUTPATIENT
Start: 2023-06-10

## 2023-07-07 ENCOUNTER — OFFICE VISIT (OUTPATIENT)
Dept: INTERNAL MEDICINE CLINIC | Facility: CLINIC | Age: 63
End: 2023-07-07

## 2023-07-07 VITALS
DIASTOLIC BLOOD PRESSURE: 66 MMHG | BODY MASS INDEX: 24.43 KG/M2 | WEIGHT: 180.38 LBS | SYSTOLIC BLOOD PRESSURE: 124 MMHG | HEART RATE: 102 BPM | HEIGHT: 72 IN

## 2023-07-07 DIAGNOSIS — E11.69 DYSLIPIDEMIA ASSOCIATED WITH TYPE 2 DIABETES MELLITUS: ICD-10-CM

## 2023-07-07 DIAGNOSIS — Z00.00 ANNUAL PHYSICAL EXAM: Primary | ICD-10-CM

## 2023-07-07 DIAGNOSIS — E11.9 TYPE 2 DIABETES MELLITUS WITHOUT COMPLICATION, WITHOUT LONG-TERM CURRENT USE OF INSULIN (HCC): ICD-10-CM

## 2023-07-07 DIAGNOSIS — I10 ESSENTIAL HYPERTENSION: ICD-10-CM

## 2023-07-07 DIAGNOSIS — Z86.010 HX OF ADENOMATOUS COLONIC POLYPS: ICD-10-CM

## 2023-07-07 DIAGNOSIS — G47.33 OBSTRUCTIVE SLEEP APNEA: ICD-10-CM

## 2023-07-07 DIAGNOSIS — E78.5 DYSLIPIDEMIA ASSOCIATED WITH TYPE 2 DIABETES MELLITUS: ICD-10-CM

## 2023-07-07 PROCEDURE — 3074F SYST BP LT 130 MM HG: CPT | Performed by: INTERNAL MEDICINE

## 2023-07-07 PROCEDURE — 3008F BODY MASS INDEX DOCD: CPT | Performed by: INTERNAL MEDICINE

## 2023-07-07 PROCEDURE — 3078F DIAST BP <80 MM HG: CPT | Performed by: INTERNAL MEDICINE

## 2023-07-07 PROCEDURE — 99396 PREV VISIT EST AGE 40-64: CPT | Performed by: INTERNAL MEDICINE

## 2023-07-07 NOTE — PATIENT INSTRUCTIONS
Your blood pressure today was excellent at 124/66. Please come in soon for blood and urine testing. Continue current medication and healthy diet. Please try to exercise regularly. Await upcoming eye exam.  Return visit in 6 months.

## 2023-07-08 ENCOUNTER — LAB ENCOUNTER (OUTPATIENT)
Dept: LAB | Age: 63
End: 2023-07-08
Attending: INTERNAL MEDICINE
Payer: COMMERCIAL

## 2023-07-08 DIAGNOSIS — Z00.00 ANNUAL PHYSICAL EXAM: ICD-10-CM

## 2023-07-08 LAB
ALBUMIN SERPL-MCNC: 4.2 G/DL (ref 3.4–5)
ALBUMIN/GLOB SERPL: 1.1 {RATIO} (ref 1–2)
ALP LIVER SERPL-CCNC: 55 U/L
ALT SERPL-CCNC: 91 U/L
ANION GAP SERPL CALC-SCNC: 8 MMOL/L (ref 0–18)
AST SERPL-CCNC: 39 U/L (ref 15–37)
BILIRUB SERPL-MCNC: 1.7 MG/DL (ref 0.1–2)
BUN BLD-MCNC: 17 MG/DL (ref 7–18)
BUN/CREAT SERPL: 15.5 (ref 10–20)
CALCIUM BLD-MCNC: 9.5 MG/DL (ref 8.5–10.1)
CHLORIDE SERPL-SCNC: 104 MMOL/L (ref 98–112)
CHOLEST SERPL-MCNC: 112 MG/DL (ref ?–200)
CO2 SERPL-SCNC: 30 MMOL/L (ref 21–32)
COMPLEXED PSA SERPL-MCNC: 2.51 NG/ML (ref ?–4)
CREAT BLD-MCNC: 1.1 MG/DL
CREAT UR-SCNC: 214 MG/DL
DEPRECATED RDW RBC AUTO: 41.1 FL (ref 35.1–46.3)
ERYTHROCYTE [DISTWIDTH] IN BLOOD BY AUTOMATED COUNT: 13.1 % (ref 11–15)
EST. AVERAGE GLUCOSE BLD GHB EST-MCNC: 157 MG/DL (ref 68–126)
FASTING PATIENT LIPID ANSWER: YES
FASTING STATUS PATIENT QL REPORTED: YES
GFR SERPLBLD BASED ON 1.73 SQ M-ARVRAT: 76 ML/MIN/1.73M2 (ref 60–?)
GLOBULIN PLAS-MCNC: 3.8 G/DL (ref 2.8–4.4)
GLUCOSE BLD-MCNC: 149 MG/DL (ref 70–99)
HBA1C MFR BLD: 7.1 % (ref ?–5.7)
HCT VFR BLD AUTO: 48.5 %
HDLC SERPL-MCNC: 42 MG/DL (ref 40–59)
HGB BLD-MCNC: 16.1 G/DL
LDLC SERPL CALC-MCNC: 49 MG/DL (ref ?–100)
MCH RBC QN AUTO: 28.9 PG (ref 26–34)
MCHC RBC AUTO-ENTMCNC: 33.2 G/DL (ref 31–37)
MCV RBC AUTO: 87.1 FL
MICROALBUMIN UR-MCNC: 3.72 MG/DL
MICROALBUMIN/CREAT 24H UR-RTO: 17.4 UG/MG (ref ?–30)
NONHDLC SERPL-MCNC: 70 MG/DL (ref ?–130)
OSMOLALITY SERPL CALC.SUM OF ELEC: 298 MOSM/KG (ref 275–295)
PLATELET # BLD AUTO: 247 10(3)UL (ref 150–450)
POTASSIUM SERPL-SCNC: 3.5 MMOL/L (ref 3.5–5.1)
PROT SERPL-MCNC: 8 G/DL (ref 6.4–8.2)
RBC # BLD AUTO: 5.57 X10(6)UL
SODIUM SERPL-SCNC: 142 MMOL/L (ref 136–145)
TRIGL SERPL-MCNC: 112 MG/DL (ref 30–149)
VLDLC SERPL CALC-MCNC: 16 MG/DL (ref 0–30)
WBC # BLD AUTO: 5.1 X10(3) UL (ref 4–11)

## 2023-07-08 PROCEDURE — 83036 HEMOGLOBIN GLYCOSYLATED A1C: CPT

## 2023-07-08 PROCEDURE — 82043 UR ALBUMIN QUANTITATIVE: CPT

## 2023-07-08 PROCEDURE — 82570 ASSAY OF URINE CREATININE: CPT

## 2023-07-08 PROCEDURE — 80053 COMPREHEN METABOLIC PANEL: CPT

## 2023-07-08 PROCEDURE — 85027 COMPLETE CBC AUTOMATED: CPT

## 2023-07-08 PROCEDURE — 80061 LIPID PANEL: CPT

## 2023-07-08 PROCEDURE — 36415 COLL VENOUS BLD VENIPUNCTURE: CPT

## 2023-08-27 DIAGNOSIS — I10 ESSENTIAL HYPERTENSION: ICD-10-CM

## 2023-08-27 DIAGNOSIS — E78.5 DYSLIPIDEMIA: ICD-10-CM

## 2023-08-28 RX ORDER — ATORVASTATIN CALCIUM 20 MG/1
20 TABLET, FILM COATED ORAL NIGHTLY
Qty: 90 TABLET | Refills: 3 | Status: SHIPPED | OUTPATIENT
Start: 2023-08-28

## 2023-08-28 RX ORDER — LOSARTAN POTASSIUM AND HYDROCHLOROTHIAZIDE 25; 100 MG/1; MG/1
1 TABLET ORAL DAILY
Qty: 90 TABLET | Refills: 3 | Status: SHIPPED | OUTPATIENT
Start: 2023-08-28

## 2023-11-26 RX ORDER — POTASSIUM CHLORIDE 20 MEQ/1
20 TABLET, EXTENDED RELEASE ORAL DAILY
Qty: 90 TABLET | Refills: 3 | Status: SHIPPED | OUTPATIENT
Start: 2023-11-26

## 2023-11-26 NOTE — TELEPHONE ENCOUNTER
Review pended refill request as it does not fall under a protocol.     Last Rx: 11/14/22  LOV: 7/7/23

## 2024-01-08 ENCOUNTER — TELEPHONE (OUTPATIENT)
Dept: INTERNAL MEDICINE CLINIC | Facility: CLINIC | Age: 64
End: 2024-01-08

## 2024-01-08 NOTE — TELEPHONE ENCOUNTER
Patient scheduled an appt via [employer] for the following concern:    Diabetes checkup and heartbeat question

## 2024-01-08 NOTE — TELEPHONE ENCOUNTER
Spoke to patient regarding the note below. He wears an apple watch and while he was at work, twice his heart rate was about 120. No symptoms at all but \"was probably aggravated!\" He stated, \"Dr. Cassidy tells me to let him know things so I was just doing that.\"     Advised ok to come in as scheduled.

## 2024-01-12 ENCOUNTER — OFFICE VISIT (OUTPATIENT)
Dept: INTERNAL MEDICINE CLINIC | Facility: CLINIC | Age: 64
End: 2024-01-12
Payer: COMMERCIAL

## 2024-01-12 ENCOUNTER — LAB ENCOUNTER (OUTPATIENT)
Dept: LAB | Age: 64
End: 2024-01-12
Attending: INTERNAL MEDICINE
Payer: COMMERCIAL

## 2024-01-12 VITALS
SYSTOLIC BLOOD PRESSURE: 120 MMHG | BODY MASS INDEX: 24.38 KG/M2 | HEART RATE: 102 BPM | HEIGHT: 72 IN | DIASTOLIC BLOOD PRESSURE: 74 MMHG | WEIGHT: 180 LBS | OXYGEN SATURATION: 97 %

## 2024-01-12 DIAGNOSIS — E11.9 TYPE 2 DIABETES MELLITUS WITHOUT COMPLICATION, WITHOUT LONG-TERM CURRENT USE OF INSULIN (HCC): ICD-10-CM

## 2024-01-12 DIAGNOSIS — E11.9 TYPE 2 DIABETES MELLITUS WITHOUT COMPLICATION, WITHOUT LONG-TERM CURRENT USE OF INSULIN (HCC): Primary | ICD-10-CM

## 2024-01-12 DIAGNOSIS — I10 ESSENTIAL HYPERTENSION: ICD-10-CM

## 2024-01-12 LAB
EST. AVERAGE GLUCOSE BLD GHB EST-MCNC: 166 MG/DL (ref 68–126)
HBA1C MFR BLD: 7.4 % (ref ?–5.7)

## 2024-01-12 PROCEDURE — 36415 COLL VENOUS BLD VENIPUNCTURE: CPT

## 2024-01-12 PROCEDURE — 3074F SYST BP LT 130 MM HG: CPT | Performed by: INTERNAL MEDICINE

## 2024-01-12 PROCEDURE — 3008F BODY MASS INDEX DOCD: CPT | Performed by: INTERNAL MEDICINE

## 2024-01-12 PROCEDURE — 99214 OFFICE O/P EST MOD 30 MIN: CPT | Performed by: INTERNAL MEDICINE

## 2024-01-12 PROCEDURE — 3078F DIAST BP <80 MM HG: CPT | Performed by: INTERNAL MEDICINE

## 2024-01-12 PROCEDURE — 83036 HEMOGLOBIN GLYCOSYLATED A1C: CPT

## 2024-01-12 NOTE — PATIENT INSTRUCTIONS
Await results of glycohemoglobin level.  Your blood pressure today was excellent at 120/74.  Continue current medications, healthy diet and regular exercise.  Please schedule a physical in July

## 2024-01-12 NOTE — PROGRESS NOTES
Cl Chowdary is a 63 year old male.   Chief Complaint   Patient presents with    Follow - Up     HPI:   Travis presents this morning for 6-month follow-up of type 2 diabetes and hypertension.    Overall he has been feeling well.  Upcoming business trip to [place].  Recently he has had 2 episodes where he received an alert on his Apple Watch saying that his heart rate was fast at 130.  He was not active at the time and had no symptoms.  Episodes only lasted for a few minutes and then resolved.  Discussed evaluation with cardiac monitor but he wishes to defer for now.    Accu-Cheks typically 120s and BP checks at home typically 120s/70s.  Diet healthy.  He exercises somewhat regularly, dancing 2 days a week.  Weight has been stable.  No headaches.  No lightheadedness or dizziness.  No palpitations or chest pain.  No shortness of breath.    Medications reviewed, as listed below.  Eye exam July 2023 normal with an outside provider without retinopathy.  Received an influenza vaccine, recent COVID booster and RSV vaccine this season.    Allergies   Allergen Reactions    Codeine NAUSEA AND VOMITING      Past Medical History:   Diagnosis Date    Aortic atherosclerosis (HCC)     CT scan 8-17    Diverticulosis     Colonoscopy 10-12    Dyslipidemia associated with type 2 diabetes mellitus  (HCC)     Essential hypertension 2001    Hepatic steatosis 09/2015    Workup (iron studies, hepatitis virus serologies, ANTHONY) negative    Hx of adenomatous colonic polyps 03/2023    Obstructive sleep apnea 06/2009    On nightly CPAP    Pneumonia [employer]    Type 2 diabetes mellitus (HCC) 08/2015     Past Surgical History:   Procedure Laterality Date    COLONOSCOPY N/A 3/10/2023    Procedure: COLONOSCOPY;  Surgeon: Erasmo Cortés MD;  [facility]    EYE SURGERY Bilateral 2002    Radial keratotomy    EYE SURGERY Left October 2013    SHOULDER ARTHROSCOPY Right March 2011    Rotator cuff repair    SHOULDER ARTHROSCOPY Right  September 2011    Rotator cuff repair    VASECTOMY  1997      Social History:  Social History     Socioeconomic History    Marital status:    Tobacco Use    Smoking status: Never    Smokeless tobacco: Never   Vaping Use    Vaping Use: Never used   Substance and Sexual Activity    Alcohol use: Not Currently    Drug use: No   Other Topics Concern    Caffeine Concern Yes     Comment: coffee/tea        EXAM:   GENERAL: Pleasant male appearing well in no distress  /74   Pulse 102   Ht 6' (1.829 m)   Wt 180 lb (81.6 kg)   SpO2 97%   BMI 24.41 kg/m²   LUNGS: Resonant to percussion and clear to auscultation  CARDIAC: Rhythm regular S1 S2 normal without murmur   ABDOMEN: Bowel sounds normal soft nontender       ASSESSMENT AND PLAN:   1. Type 2 diabetes mellitus without complication, without long-term current use of insulin ([facility])  Check glycohemoglobin today.  Order sent.  Continue current medication.  Discussion regarding evaluation of asymptomatic elevated heart rate as above.  Physical with complete labs in 6 months.  - Hemoglobin A1C; Future    2. Essential hypertension  Well-controlled.  Continue current medications.      The patient indicates understanding of these issues and agrees to the plan.  The patient is asked to return in 6 months.    Brayden Cassidy MD  1/12/2024  8:25 AM

## 2024-01-24 NOTE — TELEPHONE ENCOUNTER
Patient returned call, seen in [address] [place] [address] Rd 6/10/19 with HA, fever and coughing, reports continued symptoms of dry coughing, very mild congestion, headaches and low-grade fever, using Advil as needed. Denied sob/wheezing, no new symptoms.  Requesting further recommen PAST SURGICAL HISTORY:  History of

## 2024-04-03 ENCOUNTER — HOSPITAL ENCOUNTER (OUTPATIENT)
Dept: CT IMAGING | Facility: HOSPITAL | Age: 64
Discharge: HOME | End: 2024-04-03
Attending: INTERNAL MEDICINE

## 2024-04-03 VITALS — SYSTOLIC BLOOD PRESSURE: 118 MMHG | DIASTOLIC BLOOD PRESSURE: 82 MMHG

## 2024-04-03 DIAGNOSIS — Z13.6 SCREENING FOR CARDIOVASCULAR CONDITION: ICD-10-CM

## 2024-04-04 NOTE — PROGRESS NOTES
Date of Service 4/3/2024    PATRICIA ZUNIGA  Date of Birth 3/25/1960    Patient Age: 64 year old    PCP: Brayden Cassidy MD  [address] [facility] [id number]    Heart Scan Consult  Preliminary Heart Scan Score: 167    Previous Screening  Heart Scan Completed Previously: No        Peripheral Vascular Scan Completed Previously: No          Risk Factors  Personal Risk Factors  Non-alterable Risk Factors: Age;Gender  Alterable Risk Factors: High Blood Pressure;Abnormal Cholesterol;Diabetes      Body Mass Index  There is no height or weight on file to calculate BMI.    Blood Pressure     /82 (BP Location: Right arm)     (Normal =< 120/80,  Elevated = 120-129/ >80,  High Stage1 130-139/80-89 , Stage2 >140/>90)    Lipid Profile  Cholesterol: 112, done on 7/8/2023.  HDL Cholesterol: 42, done on 7/8/2023.  LDL Cholesterol: 49, done on 7/8/2023.  TriGlycerides 112, done on 7/8/2023.    Cholesterol Goals  Value   Total  =< 200   HDL  = > 45 Men = > 55 Women   LDL   =< 100   Triglycerides  =< 150       Glucose and Hemoglobin A1C  Lab Results   Component Value Date     (H) 07/08/2023    A1C 7.4 (H) 01/12/2024     (Normal Fasting Glucose < 100mg/dl )    Nurse Review  Risk factor information and results reviewed with Nurse: Yes    Recommended Follow Up:  Consult your physician regarding:: Final Heart Scan Report;Discuss potential for Incidental Finding;Discuss Potential for Score Variance      Recommendations for Change:  Nutrition Changes: Low Saturated Fat;Low Fat Dairy;Low Salt Eating;Increase Fiber    Cholesterol Modification (goal of therapy depends upon your risk): No Change Needed    Exercise:  (Reports exercising by dancing)    Smoking Cessation: No Change Needed (Not a smoker)         Stress Management: Adopt Stress Management Techniques    Repeat Heart Scan: 3 Years if Calcium Score is > 0.0;Discuss with your Physician              [place] Recommended Resources:  Recommended Resources: Upcoming  Classes, Medical Services and Health Library [email or web address]     Other Resources:: Manish [facility]nel[facility] - Controlling Risk Factors, Cholesterol, Diabetes, and Hypertension      Mina PATIÑO, RN        Please Contact the Nurse Heart Line with any Questions or Concerns [phone].

## 2024-04-09 PROBLEM — I25.10 ASHD (ARTERIOSCLEROTIC HEART DISEASE): Status: ACTIVE | Noted: 2024-04-09

## 2024-05-24 ENCOUNTER — HOSPITAL ENCOUNTER (OUTPATIENT)
Dept: CV DIAGNOSTICS | Facility: HOSPITAL | Age: 64
Discharge: HOME | End: 2024-05-24
Attending: INTERNAL MEDICINE

## 2024-05-24 DIAGNOSIS — R93.1 ABNORMAL HEART SCORE CT: ICD-10-CM

## 2024-05-24 LAB
% OF MAX PREDICTED HR: 100 %
MAX DIASTOLIC BP: 63 MMHG
MAX HEART RATE: 173 BPM
MAX PREDICTED HEART RATE: 158 BPM
MAX SYSTOLIC BP: 183 MMHG
MAX WORK LOAD: 100

## 2024-05-24 PROCEDURE — 93350 STRESS TTE ONLY: CPT | Performed by: INTERNAL MEDICINE

## 2024-05-24 PROCEDURE — 93018 CV STRESS TEST I&R ONLY: CPT | Performed by: STUDENT IN AN ORGANIZED HEALTH CARE EDUCATION/TRAINING PROGRAM

## 2024-05-24 PROCEDURE — 93016 CV STRESS TEST SUPVJ ONLY: CPT | Performed by: STUDENT IN AN ORGANIZED HEALTH CARE EDUCATION/TRAINING PROGRAM

## 2024-05-24 PROCEDURE — 93017 CV STRESS TEST TRACING ONLY: CPT | Performed by: INTERNAL MEDICINE

## 2024-07-19 ENCOUNTER — OFFICE VISIT (OUTPATIENT)
Dept: INTERNAL MEDICINE CLINIC | Facility: CLINIC | Age: 64
End: 2024-07-19
Payer: COMMERCIAL

## 2024-07-19 ENCOUNTER — LAB ENCOUNTER (OUTPATIENT)
Dept: LAB | Age: 64
End: 2024-07-19
Attending: INTERNAL MEDICINE
Payer: COMMERCIAL

## 2024-07-19 VITALS
HEIGHT: 72 IN | SYSTOLIC BLOOD PRESSURE: 124 MMHG | DIASTOLIC BLOOD PRESSURE: 80 MMHG | BODY MASS INDEX: 24.79 KG/M2 | HEART RATE: 87 BPM | WEIGHT: 183 LBS

## 2024-07-19 DIAGNOSIS — E78.5 DYSLIPIDEMIA ASSOCIATED WITH TYPE 2 DIABETES MELLITUS (HCC): ICD-10-CM

## 2024-07-19 DIAGNOSIS — I70.0 AORTIC ATHEROSCLEROSIS (HCC): ICD-10-CM

## 2024-07-19 DIAGNOSIS — E11.69 DYSLIPIDEMIA ASSOCIATED WITH TYPE 2 DIABETES MELLITUS (HCC): ICD-10-CM

## 2024-07-19 DIAGNOSIS — I10 ESSENTIAL HYPERTENSION: ICD-10-CM

## 2024-07-19 DIAGNOSIS — Z86.010 HX OF ADENOMATOUS COLONIC POLYPS: ICD-10-CM

## 2024-07-19 DIAGNOSIS — G47.33 OBSTRUCTIVE SLEEP APNEA: ICD-10-CM

## 2024-07-19 DIAGNOSIS — Z00.00 ANNUAL PHYSICAL EXAM: ICD-10-CM

## 2024-07-19 DIAGNOSIS — Z00.00 ANNUAL PHYSICAL EXAM: Primary | ICD-10-CM

## 2024-07-19 DIAGNOSIS — I25.10 ASHD (ARTERIOSCLEROTIC HEART DISEASE): ICD-10-CM

## 2024-07-19 DIAGNOSIS — E11.9 TYPE 2 DIABETES MELLITUS WITHOUT COMPLICATION, WITHOUT LONG-TERM CURRENT USE OF INSULIN (HCC): ICD-10-CM

## 2024-07-19 LAB
ALBUMIN SERPL-MCNC: 4.6 G/DL (ref 3.2–4.8)
ALBUMIN/GLOB SERPL: 1.5 {RATIO} (ref 1–2)
ALP LIVER SERPL-CCNC: 56 U/L
ALT SERPL-CCNC: 64 U/L
ANION GAP SERPL CALC-SCNC: 5 MMOL/L (ref 0–18)
AST SERPL-CCNC: 32 U/L (ref ?–34)
BILIRUB SERPL-MCNC: 1.1 MG/DL (ref 0.2–1.1)
BUN BLD-MCNC: 14 MG/DL (ref 9–23)
BUN/CREAT SERPL: 12.8 (ref 10–20)
CALCIUM BLD-MCNC: 9.6 MG/DL (ref 8.7–10.4)
CHLORIDE SERPL-SCNC: 106 MMOL/L (ref 98–112)
CHOLEST SERPL-MCNC: 113 MG/DL (ref ?–200)
CO2 SERPL-SCNC: 30 MMOL/L (ref 21–32)
COMPLEXED PSA SERPL-MCNC: 1.77 NG/ML (ref ?–4)
CREAT BLD-MCNC: 1.09 MG/DL
CREAT UR-SCNC: 113.9 MG/DL
DEPRECATED RDW RBC AUTO: 41.7 FL (ref 35.1–46.3)
EGFRCR SERPLBLD CKD-EPI 2021: 77 ML/MIN/1.73M2 (ref 60–?)
ERYTHROCYTE [DISTWIDTH] IN BLOOD BY AUTOMATED COUNT: 13.1 % (ref 11–15)
EST. AVERAGE GLUCOSE BLD GHB EST-MCNC: 171 MG/DL (ref 68–126)
FASTING PATIENT LIPID ANSWER: YES
FASTING STATUS PATIENT QL REPORTED: YES
GLOBULIN PLAS-MCNC: 3 G/DL (ref 2–3.5)
GLUCOSE BLD-MCNC: 159 MG/DL (ref 70–99)
HBA1C MFR BLD: 7.6 % (ref ?–5.7)
HCT VFR BLD AUTO: 47.1 %
HDLC SERPL-MCNC: 38 MG/DL (ref 40–59)
HGB BLD-MCNC: 15.9 G/DL
LDLC SERPL CALC-MCNC: 57 MG/DL (ref ?–100)
MCH RBC QN AUTO: 29.4 PG (ref 26–34)
MCHC RBC AUTO-ENTMCNC: 33.8 G/DL (ref 31–37)
MCV RBC AUTO: 87.2 FL
MICROALBUMIN UR-MCNC: 1.1 MG/DL
MICROALBUMIN/CREAT 24H UR-RTO: 9.7 UG/MG (ref ?–30)
NONHDLC SERPL-MCNC: 75 MG/DL (ref ?–130)
OSMOLALITY SERPL CALC.SUM OF ELEC: 296 MOSM/KG (ref 275–295)
PLATELET # BLD AUTO: 242 10(3)UL (ref 150–450)
POTASSIUM SERPL-SCNC: 3.9 MMOL/L (ref 3.5–5.1)
PROT SERPL-MCNC: 7.6 G/DL (ref 5.7–8.2)
RBC # BLD AUTO: 5.4 X10(6)UL
SODIUM SERPL-SCNC: 141 MMOL/L (ref 136–145)
TRIGL SERPL-MCNC: 94 MG/DL (ref 30–149)
VLDLC SERPL CALC-MCNC: 14 MG/DL (ref 0–30)
WBC # BLD AUTO: 5.3 X10(3) UL (ref 4–11)

## 2024-07-19 PROCEDURE — 3074F SYST BP LT 130 MM HG: CPT | Performed by: INTERNAL MEDICINE

## 2024-07-19 PROCEDURE — 36415 COLL VENOUS BLD VENIPUNCTURE: CPT

## 2024-07-19 PROCEDURE — 80061 LIPID PANEL: CPT

## 2024-07-19 PROCEDURE — 83036 HEMOGLOBIN GLYCOSYLATED A1C: CPT

## 2024-07-19 PROCEDURE — 3061F NEG MICROALBUMINURIA REV: CPT | Performed by: INTERNAL MEDICINE

## 2024-07-19 PROCEDURE — 85027 COMPLETE CBC AUTOMATED: CPT

## 2024-07-19 PROCEDURE — 82570 ASSAY OF URINE CREATININE: CPT

## 2024-07-19 PROCEDURE — 80053 COMPREHEN METABOLIC PANEL: CPT

## 2024-07-19 PROCEDURE — 3079F DIAST BP 80-89 MM HG: CPT | Performed by: INTERNAL MEDICINE

## 2024-07-19 PROCEDURE — 3008F BODY MASS INDEX DOCD: CPT | Performed by: INTERNAL MEDICINE

## 2024-07-19 PROCEDURE — 99396 PREV VISIT EST AGE 40-64: CPT | Performed by: INTERNAL MEDICINE

## 2024-07-19 PROCEDURE — 3051F HG A1C>EQUAL 7.0%<8.0%: CPT | Performed by: INTERNAL MEDICINE

## 2024-07-19 PROCEDURE — 82043 UR ALBUMIN QUANTITATIVE: CPT

## 2024-07-19 RX ORDER — ASPIRIN 81 MG/1
81 TABLET ORAL DAILY
COMMUNITY

## 2024-07-19 NOTE — H&P
Cl Chowdary is a 64 year old male who presents for a complete physical exam.   HPI:   His last physical was July 2023. He feels well. No issues for discussion today.    Past medical and past surgical histories reviewed, as outlined below.  Medications reviewed, as listed.  Medication allergies reviewed- intolerance to codeine.    No recent home Accu-Chek monitoring.  BP checks typically 120s/70s.  Diet healthy and he has been walking regularly.  Weight up 3 pounds since physical July 2023.  Eye exam August 2023 normal without retinopathy.  Next colonoscopy due March 2028.    Wt Readings from Last 4 Encounters:   07/19/24 183 lb (83 kg)   01/12/24 180 lb (81.6 kg)   07/07/23 180 lb 6.4 oz (81.8 kg)   06/09/23 182 lb 3.2 oz (82.6 kg)     Body mass index is 24.82 kg/m².     Current Outpatient Medications   Medication Sig Dispense Refill    aspirin 81 MG Oral Tab EC Take 1 tablet (81 mg total) by mouth daily.      potassium chloride (KLOR-CON M20) 20 MEQ Oral Tab CR Take 1 tablet (20 mEq total) by mouth daily. 90 tablet 3    atorvastatin 20 MG Oral Tab Take 1 tablet (20 mg total) by mouth nightly. 90 tablet 3    losartan-hydroCHLOROthiazide 100-25 MG Oral Tab Take 1 tablet by mouth daily. 90 tablet 3    metFORMIN HCl 1000 MG Oral Tab Take 1 tablet (1,000 mg total) by mouth 2 (two) times daily with meals. 180 tablet 3    Multiple Vitamins-Minerals (MULTI-VITAMIN/MINERALS) Oral Tab Take 1 tablet by mouth daily.       Allergies   Allergen Reactions    Codeine NAUSEA AND VOMITING      Past Medical History:    Aortic atherosclerosis (HCC)    CT scan 8-17    ASHD (arteriosclerotic heart disease)    Subclinical; cardiac CT 4-24 with calcium score 168 (LCx>LAD>RCA); stress Echo 5-24 normal    Diverticulosis    Colonoscopy 10-12    Dyslipidemia associated with type 2 diabetes mellitus (HCC)    Essential hypertension    Hepatic steatosis    Workup (iron studies, hepatitis virus serologies, ANTHONY) negative    Hx of  adenomatous colonic polyps    Obstructive sleep apnea    On nightly CPAP    Pneumonia    Type 2 diabetes mellitus (HCC)      Past Surgical History:   Procedure Laterality Date    Colonoscopy N/A 3/10/2023    Procedure: COLONOSCOPY;  Surgeon: Erasmo Cortés MD;  Location: [facility]    Eye surgery Bilateral 2002    Radial keratotomy    Eye surgery Left October 2013    Shoulder arthroscopy Right March 2011    Rotator cuff repair    Shoulder arthroscopy Right September 2011    Rotator cuff repair    Vasectomy  1997      Family History   Problem Relation Age of Onset    Other (Sinus Cancer) Father     Diabetes Mother     Hypertension Mother     Lipids Mother     Dementia Mother     Diabetes Paternal Grandmother     Hypertension Paternal Grandmother     Hypertension Brother     Colon Cancer Paternal Grandfather     Breast Cancer Sister       Social History:  Social History     Socioeconomic History    Marital status:    Tobacco Use    Smoking status: Never    Smokeless tobacco: Never   Vaping Use    Vaping status: Never Used   Substance and Sexual Activity    Alcohol use: Not Currently    Drug use: No   Other Topics Concern    Caffeine Concern Yes     Comment: coffee/tea           REVIEW OF SYSTEMS:   GENERAL: No fever  LUNGS: No cough wheezing or shortness of breath  CARDIAC: No lightheadedness palpitations or chest pain  GI: No anorexia heartburn dysphagia nausea vomiting abdominal pain diarrhea constipation or rectal bleeding  : No urinary frequency dysuria or hematuria  MUSCULOSKELETAL: Occasional pain right shoulder. No leg swelling. No foot ulcerations or lesions  NEURO: No headaches. No numbness or tingling in either foot    EXAM:   GENERAL: Pleasant male appearing well in no distress  /80   Pulse 87   Ht 6' (1.829 m)   Wt 183 lb (83 kg)   BMI 24.82 kg/m²   HEENT: Anicteric, conjunctiva pink, oropharynx normal  NECK: Supple without mass or thyromegaly  NODES: No peripheral adenopathy  LUNGS:  Resonant to percussion and clear to auscultation  CARDIAC: Rhythm regular S1 S2 normal without murmur or edema  ABDOMEN: Bowel sounds normal soft nontender without mass or hepatosplenomegaly  EXTREMITIES: Bilateral barefoot skin diabetic exam is normal, visualized feet and the appearance is normal.  Bilateral monofilament/sensation of both feet is normal.  Pulsation pedal pulse exam of both lower legs/feet is normal as well  PULSES: 2+ bilateral dorsalis pedis and posterior tibial  NEURO: Sensory testing with the 10g monofilament diabetic sensory exam instrument is normal in both feet    ASSESSMENT AND PLAN:   Cl Chowdary is a 64 year old male who presents for a complete physical exam.     1. Annual physical exam  Check CMP CBC glycohemoglobin lipid profile screening PSA and urine microalbumin today.  Orders sent  Reminded him that he will be due for eye exam this August  Continue current medications  Reinforced healthy diet and regular exercise  Annual physical  Return visit in 6 months for recheck  - Comp Metabolic Panel (14); Future  - CBC, Platelet; No Differential; Future  - Hemoglobin A1C; Future  - Lipid Panel; Future  - PSA Total, Screen; Future  - Microalb/Creat Ratio, Random Urine; Future    2. ASHD (arteriosclerotic heart disease)  Subclinical and asymptomatic.  Strict risk factor control.  Continue statin and aspirin    3. Type 2 diabetes mellitus without complication, without long-term current use of insulin (HCC)  Continue current medication and await labs    4. Essential hypertension  Well-controlled.  Continue current medication    5. Dyslipidemia associated with type 2 diabetes mellitus (HCC)  Continue statin and await labs    6. Hx of adenomatous colonic polyps  Next colonoscopy due March 2028    7. Obstructive sleep apnea  Continue nightly CPAP    8. Aortic atherosclerosis (HCC)  Asymptomatic.  Continue risk factor control including statin and aspirin      Brayden Cassidy  MD  7/19/2024  8:18 AM

## 2024-07-20 NOTE — PATIENT INSTRUCTIONS
Your blood pressure today was good at 124/80 and your exam was normal  Await results of today's blood tests  You will be due for an eye exam in August  Continue current medication, healthy diet and regular exercise  Annual physical  Return visit in 6 months for recheck

## 2024-07-29 ENCOUNTER — TELEPHONE (OUTPATIENT)
Dept: INTERNAL MEDICINE CLINIC | Facility: CLINIC | Age: 64
End: 2024-07-29

## 2024-07-29 RX ORDER — GLIMEPIRIDE 4 MG/1
4 TABLET ORAL
Qty: 90 TABLET | Refills: 1 | Status: SHIPPED | OUTPATIENT
Start: 2024-07-29

## 2024-07-29 NOTE — TELEPHONE ENCOUNTER
Patient calling back. States he checked with his insurance company for drug coverage.  Patient states Jardiance will cost him $1649 for 3 months supply.  Patient states he cannot afford this and would like alternative option.  Please advise.

## 2024-07-29 NOTE — TELEPHONE ENCOUNTER
Verified name and .    Patient calling to respond to Dr. Cassidy's result note message.    He states that he is agreeable to start Jardiance 10 mg and is requesting that prescription be sent to the pharmacy.    Medication pended for your review and approval.            Your chemistry profile (glucose, electrolytes, kidney and liver function) is normal except for slightly high glucose and slightly high ALT, a liver enzyme, which is stable     Your blood count is normal     Your glycohemoglobin level, an average of your blood glucose readings over the previous 2-3 months, is slightly high at 7.6% (target 7.0% and below)     Your cholesterol profile is normal except for low HDL (good cholesterol), which can be improved with regular exercise     PSA level, screening for prostate cancer, is normal     Urine testing is normal without abnormally high protein     Ray, I would like to see your glycohemoglobin level closer to 7.0% and even below.  You are on the maximal dose of metformin.  I would recommend you consider beginning a new medication along with metformin called Jardiance 10 mg 1 pill daily.  Please contact me if you agree and I can send a prescription to your pharmacy   Written by Brayden Cassidy MD on 2024  8:30 PM CDT

## 2024-08-19 DIAGNOSIS — I10 ESSENTIAL HYPERTENSION: ICD-10-CM

## 2024-08-19 DIAGNOSIS — E78.5 DYSLIPIDEMIA: ICD-10-CM

## 2024-08-21 RX ORDER — ATORVASTATIN CALCIUM 20 MG/1
20 TABLET, FILM COATED ORAL NIGHTLY
Qty: 90 TABLET | Refills: 3 | Status: SHIPPED | OUTPATIENT
Start: 2024-08-21

## 2024-08-21 RX ORDER — LOSARTAN POTASSIUM AND HYDROCHLOROTHIAZIDE 25; 100 MG/1; MG/1
1 TABLET ORAL DAILY
Qty: 90 TABLET | Refills: 3 | Status: SHIPPED | OUTPATIENT
Start: 2024-08-21

## 2024-08-21 NOTE — TELEPHONE ENCOUNTER
Please review; protocol failed/No Protocol    Requested Prescriptions   Pending Prescriptions Disp Refills    metFORMIN HCl 1000 MG Oral Tab [Pharmacy Med Name: METFORMIN HCL TABS 1000MG] 180 tablet 3     Sig: Take 1 tablet (1,000 mg total) by mouth 2 (two) times daily with meals.       Diabetes Medication Protocol Failed - 8/19/2024  3:04 PM        Failed - Last A1C < 7.5 and within past 6 months     Lab Results   Component Value Date    A1C 7.6 (H) 07/19/2024             Passed - In person appointment or virtual visit in the past 6 mos or appointment in next 3 mos     Recent Outpatient Visits              1 month ago Annual physical exam    [facility]    [facility] Visit    7 months ago Type 2 diabetes mellitus without complication, without long-term current use of insulin (HCC)    [facility] Mary Bolaños Michael, MD    Office Visit    1 year ago Annual physical exam    [facility]    Office Visit    1 year ago Dog bite of right thigh, initial encounter    [facility]    Office Visit    1 year ago Colon cancer screening    [facility]    [facility] Only                      Passed - Microalbumin procedure in past 12 months or taking ACE/ARB        Passed - EGFRCR or GFRNAA > 50     GFR Evaluation  EGFRCR: 77 , resulted on 7/19/2024          Passed - GFR in the past 12 months         Signed Prescriptions Disp Refills    losartan-hydroCHLOROthiazide 100-25 MG Oral Tab 90 tablet 3     Sig: Take 1 tablet by mouth daily.       Hypertension Medications Protocol Passed - 8/19/2024  3:04 PM        Passed - CMP or BMP in past 12 months        Passed - Last BP reading less than 140/90     BP Readings from Last 1 Encounters:   07/19/24 124/80               Passed -  In person appointment or virtual visit in the past 12 mos or appointment in next 3 mos     Recent Outpatient Visits              1 month ago [facility] exam    [facility]    Office Visit    7 months ago Type 2 diabetes mellitus without complication, without long-term current use of insulin (HCC)    [facility]    Office Visit    1 year ago Annual physical exam    [facility]    Office Visit    1 year ago Dog bite of right thigh, initial encounter    [facility]    Office Visit    1 year ago Colon cancer screening    [facility]    [facility]                      Passed - EGFRCR or GFRNAA > 50     GFR Evaluation  EGFRCR: 77 , resulted on 7/19/2024            atorvastatin 20 MG Oral Tab 90 tablet 3     Sig: Take 1 tablet (20 mg total) by mouth nightly.       Cholesterol Medication Protocol Passed - 8/19/2024  3:04 PM        Passed - ALT < 80     Lab Results   Component Value Date    ALT 64 (H) 07/19/2024             Passed - ALT resulted within past year        Passed - Lipid panel within past 12 months     Lab Results   Component Value Date    CHOLEST 113 07/19/2024    TRIG 94 07/19/2024    HDL 38 (L) 07/19/2024    LDL 57 07/19/2024    VLDL 14 07/19/2024    NONHDLC 75 07/19/2024             Passed - In person appointment or virtual visit in the past 12 mos or appointment in next 3 mos     Recent Outpatient Visits              1 month ago Annual physical exam    [facility]    Office Visit    7 months ago Type 2 diabetes mellitus without complication, without long-term current use of insulin (HCC)    [facility] [place]  Brayden Cassidy MD    Office Visit    1 year ago Annual physical exam    [facility]    Office Visit    1 year ago Dog bite of right thigh, initial encounter    [facility]    Office Visit    1 year ago Colon cancer screening    [facility]    Nurse Only                           Recent Outpatient Visits              1 month ago Annual physical exam    [facility]    Office Visit    7 months ago Type 2 diabetes mellitus without complication, without long-term current use of insulin (HCC)    [facility]    Office Visit    1 year ago Annual physical exam    [facility]    Office Visit    1 year ago Dog bite of right thigh, initial encounter    [facility]    Office Visit    1 year ago Colon cancer screening    [facility]    Nurse Only

## 2024-08-21 NOTE — TELEPHONE ENCOUNTER
Refill passed per [facility] protocol.  Requested Prescriptions   Pending Prescriptions Disp Refills    LOSARTAN-HYDROCHLOROTHIAZIDE 100-25 MG Oral Tab [Pharmacy Med Name: LOSARTAN/ HYDROCHLOROTHIAZIDE TABS 100/25MG] 90 tablet 3     Sig: TAKE 1 TABLET DAILY       Hypertension Medications Protocol Passed - 8/19/2024  3:04 PM        Passed - CMP or BMP in past 12 months        Passed - Last BP reading less than 140/90     BP Readings from Last 1 Encounters:   07/19/24 124/80               Passed - In person appointment or virtual visit in the past 12 mos or appointment in next 3 mos     Recent Outpatient Visits              1 month ago Annual physical exam    [facility]    Office Visit    7 months ago Type 2 diabetes mellitus without complication, without long-term current use of insulin (HCC)    [facility]    Office Visit    1 year ago Annual physical exam    [facility]    Office Visit    1 year ago Dog bite of right thigh, initial encounter    [facility]    Office Visit    1 year ago Colon cancer screening    [facility]    Nurse Only                      Passed - EGFRCR or GFRNAA > 50     GFR Evaluation  EGFRCR: 77 , resulted on 7/19/2024            METFORMIN HCL 1000 MG Oral Tab [Pharmacy Med Name: METFORMIN HCL TABS 1000MG] 180 tablet 3     Sig: TAKE 1 TABLET TWICE A DAY WITH MEALS       Diabetes Medication Protocol Failed - 8/19/2024  3:04 PM        Failed - Last A1C < 7.5 and within past 6 months     Lab Results   Component Value Date    A1C 7.6 (H) 07/19/2024             Passed - In person appointment or virtual visit in the past 6 mos or appointment in next 3 mos     Recent Outpatient Visits              1  month ago Annual physical exam    [facility]    Office Visit    7 months ago Type 2 diabetes mellitus without complication, without long-term current use of insulin (HCC)    [facility]    Office Visit    1 year ago Annual physical exam    [facility]    Office Visit    1 year ago Dog bite of right thigh, initial encounter    [facility]    Office Visit    1 year ago [facility]    [facility]    [facility]                      Passed - Microalbumin procedure in past 12 months or taking ACE/ARB        Passed - EGFRCR or GFRNAA > 50     GFR Evaluation  EGFRCR: 77 , resulted on 7/19/2024          Passed - GFR in the past 12 months          ATORVASTATIN 20 MG Oral Tab [Pharmacy Med Name: ATORVASTATIN TABS 20MG] 90 tablet 3     Sig: TAKE 1 TABLET NIGHTLY       Cholesterol Medication Protocol Passed - 8/19/2024  3:04 PM        Passed - ALT < 80     Lab Results   Component Value Date    ALT 64 (H) 07/19/2024             Passed - ALT resulted within past year        Passed - Lipid panel within past 12 months     Lab Results   Component Value Date    CHOLEST 113 07/19/2024    TRIG 94 07/19/2024    HDL 38 (L) 07/19/2024    LDL 57 07/19/2024    VLDL 14 07/19/2024    NONHDLC 75 07/19/2024             Passed - In person appointment or virtual visit in the past 12 mos or appointment in next 3 mos     Recent Outpatient Visits              1 month ago Annual physical exam    [facility]    Office Visit    7 months ago Type 2 diabetes mellitus without complication, without long-term current use of insulin (HCC)    [facility]  Brayden Thornton MD    Office Visit    1 year ago Annual physical exam    [facility]    Office Visit    1 year ago Dog bite of right thigh, initial encounter    [facility]    Office Visit    1 year ago Colon cancer screening    [facility]    Nurse Only                           Recent Outpatient Visits              1 month ago Annual physical exam    [facility]    Office Visit    7 months ago Type 2 diabetes mellitus without complication, without long-term current use of insulin (HCC)    [facility]    Office Visit    1 year ago Annual physical exam    [facility]    Office Visit    1 year ago Dog bite of right thigh, initial encounter    [facility] [place]    Office Visit    1 year ago [facility] cancer [facility]    [facility]    [facility] Only

## 2024-11-27 RX ORDER — POTASSIUM CHLORIDE 1500 MG/1
20 TABLET, EXTENDED RELEASE ORAL DAILY
Qty: 90 TABLET | Refills: 0 | Status: SHIPPED | OUTPATIENT
Start: 2024-11-27

## 2024-11-27 NOTE — TELEPHONE ENCOUNTER
Please review; protocol failed/ has no protocol    Requested Prescriptions   Pending Prescriptions Disp Refills    KLOR-CON M20 20 MEQ Oral Tab CR [Pharmacy Med Name: POTASSIUM CHLORIDE ER (DISP) TABS 20MEQ] 90 tablet 3     Sig: TAKE 1 TABLET DAILY       There is no refill protocol information for this order        Recent Outpatient Visits              4 months ago Annual physical exam    [facility] B[facility] Michael, MD    Office Visit    10 months ago Type 2 diabetes mellitus without complication, without long-term current use of insulin (HCC)    [facility]    Office Visit    1 year ago Annual physical exam    [facility] [place]    Office Visit    1 year ago Dog bite of right thigh, initial encounter    [facility] Mary Bolaños Michael, MD    Office Visit    1 year ago Colon cancer screening    [facility] [place]    Nurse Only

## 2024-12-24 RX ORDER — GLIMEPIRIDE 4 MG/1
4 TABLET ORAL
Qty: 90 TABLET | Refills: 0 | Status: SHIPPED | OUTPATIENT
Start: 2024-12-24

## 2024-12-24 NOTE — TELEPHONE ENCOUNTER
Please review. Protocol failed/No protocol      Requested Prescriptions   Pending Prescriptions Disp Refills    glimepiride 4 MG Oral Tab 90 tablet 1     Sig: Take 1 tablet (4 mg total) by mouth every morning before breakfast.       Diabetes Medication Protocol Failed - 12/24/2024  7:07 AM        Failed - Last A1C < 7.5 and within past 6 months     Lab Results   Component Value Date    A1C 7.6 (H) 07/19/2024             Passed - In person appointment or virtual visit in the past 6 mos or appointment in next 3 mos     Recent Outpatient Visits              5 months ago Annual physical exam    [facility] Bola[facility] Michael, MD    Office Visit    11 months ago Type 2 diabetes mellitus without complication, without long-term current use of insulin ([facility]    [facility]    Office Visit    1 year ago Annual physical exam    [facility]    Office Visit    1 year ago Dog bite of right thigh, initial encounter    [facility] Bola[facility] Michael, MD    Office Visit    2 years ago Colon cancer screening    [facility]    Nurse Only                      Passed - Microalbumin procedure in past 12 months or taking ACE/ARB        Passed - EGFRCR or GFRNAA > 50     GFR Evaluation  EGFRCR: 77 , resulted on 7/19/2024          Passed - GFR in the past 12 months             Recent Outpatient Visits              5 months ago Annual physical exam    [facility]    Office Visit    11 months ago Type 2 diabetes mellitus without complication, without long-term current use of insulin ([facility])    [facility], [facility]    Office Visit    1 year ago Annual physical exam    [facility] Bola[facility] Michael, MD    Office Visit    1 year ago Dog bite of right thigh, initial encounter    [facility], [facility]    Office Visit    2 years ago Colon cancer screening    [facility]    Nurse Only

## 2025-01-06 ENCOUNTER — NURSE TRIAGE (OUTPATIENT)
Dept: ADMINISTRATIVE | Age: 65
End: 2025-01-06

## 2025-01-06 ENCOUNTER — OFFICE VISIT (OUTPATIENT)
Dept: FAMILY MEDICINE CLINIC | Facility: CLINIC | Age: 65
End: 2025-01-06
Payer: COMMERCIAL

## 2025-01-06 ENCOUNTER — HOSPITAL ENCOUNTER (OUTPATIENT)
Dept: GENERAL RADIOLOGY | Facility: HOSPITAL | Age: 65
Discharge: HOME | End: 2025-01-06
Attending: FAMILY MEDICINE
Payer: COMMERCIAL

## 2025-01-06 VITALS
WEIGHT: 181 LBS | SYSTOLIC BLOOD PRESSURE: 120 MMHG | HEIGHT: 72 IN | DIASTOLIC BLOOD PRESSURE: 83 MMHG | BODY MASS INDEX: 24.52 KG/M2 | OXYGEN SATURATION: 97 % | HEART RATE: 108 BPM | TEMPERATURE: 98 F

## 2025-01-06 DIAGNOSIS — Z20.828 EXPOSURE TO INFLUENZA: ICD-10-CM

## 2025-01-06 DIAGNOSIS — R09.82 POST-NASAL DRIP: ICD-10-CM

## 2025-01-06 DIAGNOSIS — R05.1 ACUTE COUGH: Primary | ICD-10-CM

## 2025-01-06 DIAGNOSIS — Z20.822 EXPOSURE TO COVID-19 VIRUS: ICD-10-CM

## 2025-01-06 DIAGNOSIS — R53.83 FATIGUE, UNSPECIFIED TYPE: ICD-10-CM

## 2025-01-06 DIAGNOSIS — R05.1 ACUTE COUGH: ICD-10-CM

## 2025-01-06 PROCEDURE — 71046 X-RAY EXAM CHEST 2 VIEWS: CPT | Performed by: FAMILY MEDICINE

## 2025-01-06 PROCEDURE — 3074F SYST BP LT 130 MM HG: CPT | Performed by: FAMILY MEDICINE

## 2025-01-06 PROCEDURE — 99202 OFFICE O/P NEW SF 15 MIN: CPT | Performed by: FAMILY MEDICINE

## 2025-01-06 PROCEDURE — 3079F DIAST BP 80-89 MM HG: CPT | Performed by: FAMILY MEDICINE

## 2025-01-06 PROCEDURE — 3008F BODY MASS INDEX DOCD: CPT | Performed by: FAMILY MEDICINE

## 2025-01-06 RX ORDER — AZITHROMYCIN 250 MG/1
TABLET, FILM COATED ORAL
Qty: 6 TABLET | Refills: 0 | Status: SHIPPED | OUTPATIENT
Start: 2025-01-06 | End: 2025-01-11

## 2025-01-06 NOTE — TELEPHONE ENCOUNTER
Action Requested: Summary for Provider     []  Critical Lab, Recommendations Needed  [] Need Additional Advice  [x]   FYI    []   Need Orders  [] Need Medications Sent to Pharmacy  []  Other     SUMMARY: Patient stated that has had a cough for a few weeks now but also mentioned symptoms since 12/22/2024. Has a runny nose and post nasal drip. Did a COVID test on 12/24/2024 and it was negative. No fevers. No shortness of breath or wheezing. No chest pain, but chest feels heavy like it did when he had pneumonia. No other symptoms. Patient's primary care doctor was Dr Cassidy, who retired 12/31/2024. Appointment made for today at 2:45pm with Dr Samano at [facility].  Advised patient to wear a mask to appointment. Patient agreed.   Reason for call: Cough/URI  Onset: Dec 22, 2024  Reason for Disposition   Continuous (nonstop) coughing interferes with work or school and no improvement using cough treatment per Care Advice    Protocols used: Cough-A-OH

## 2025-01-06 NOTE — PROGRESS NOTES
HPI:    Patient ID: Cl Chowdary is a 64 year old male.      Cough  Associated symptoms include postnasal drip. Pertinent negatives include no chills, ear pain, fever, sore throat or shortness of breath.       Chief Complaint   Patient presents with    Cough     Since December 22 no fever feels like a weight on his lungs and fatigue negative covid test at home states wife tested positive but had different symptoms.        Wt Readings from Last 6 Encounters:   01/06/25 181 lb (82.1 kg)   07/19/24 183 lb (83 kg)   01/12/24 180 lb (81.6 kg)   07/07/23 180 lb 6.4 oz (81.8 kg)   06/09/23 182 lb 3.2 oz (82.6 kg)   03/10/23 179 lb (81.2 kg)     BP Readings from Last 3 Encounters:   01/06/25 120/83   07/19/24 124/80   04/03/24 118/82     Former Patient of Dr Cassidy  [facility] sig for uncontrolled Diabetes mellitus   Cough x 2 weeks  Wife was positive for covid  2 weeks ago and one daughter also positive for covid, there daughter had influenza  Has post nasal drip  No fever  No shortness of breath   Feels heaviness in his chest  No facial pressure.    Review of Systems   Constitutional:  Positive for fatigue. Negative for chills and fever.   HENT:  Positive for postnasal drip. Negative for congestion, ear pain, facial swelling, sinus pressure, sinus pain, sneezing and sore throat.    Respiratory:  Positive for cough. Negative for choking, chest tightness and shortness of breath.    Gastrointestinal:  Negative for abdominal pain, constipation, diarrhea and vomiting.   Neurological:  Negative for dizziness.       /83   Pulse 108   Temp 97.8 °F (36.6 °C) (Temporal)   Ht 6' (1.829 m)   Wt 181 lb (82.1 kg)   SpO2 97%   BMI 24.55 kg/m²          Current Outpatient Medications   Medication Sig Dispense Refill    azithromycin 250 MG Oral Tab Take 2 tablets (500 mg total) by mouth daily for 1 day, THEN 1 tablet (250 mg total) daily for 4 days. 6 tablet 0    glimepiride 4 MG Oral Tab Take 1 tablet (4 mg total) by mouth  every morning before breakfast. 90 tablet 0    potassium chloride (KLOR-CON M20) 20 MEQ Oral Tab CR Take 1 tablet (20 mEq total) by mouth daily. 90 tablet 0    losartan-hydroCHLOROthiazide 100-25 MG Oral Tab Take 1 tablet by mouth daily. 90 tablet 3    metFORMIN HCl 1000 MG Oral Tab Take 1 tablet (1,000 mg total) by mouth 2 (two) times daily with meals. 180 tablet 3    atorvastatin 20 MG Oral Tab Take 1 tablet (20 mg total) by mouth nightly. 90 tablet 3    aspirin 81 MG Oral Tab EC Take 1 tablet (81 mg total) by mouth daily.      Multiple Vitamins-Minerals (MULTI-VITAMIN/MINERALS) Oral Tab Take 1 tablet by mouth daily.       Allergies:Allergies[1]   PHYSICAL EXAM:     Chief Complaint   Patient presents with    Cough     Since December 22 no fever feels like a weight on his lungs and fatigue negative covid test at home states wife tested positive but had different symptoms.       Physical Exam  Vitals reviewed.   HENT:      Head: Normocephalic and atraumatic.      Right Ear: Tympanic membrane normal.      Left Ear: Tympanic membrane normal.      Nose: No congestion or rhinorrhea.      Mouth/Throat:      Pharynx: No oropharyngeal exudate or posterior oropharyngeal erythema.   Cardiovascular:      Rate and Rhythm: Normal rate and regular rhythm.      Pulses: Normal pulses.      Heart sounds: Normal heart sounds.   Pulmonary:      Effort: Pulmonary effort is normal.      Breath sounds: Normal breath sounds. No wheezing, rhonchi or rales.   Abdominal:      Tenderness: There is no abdominal tenderness.   Musculoskeletal:      Cervical back: Normal range of motion and neck supple.   Lymphadenopathy:      Cervical: No cervical adenopathy.   Neurological:      Mental Status: He is alert.                ASSESSMENT/PLAN:     Encounter Diagnoses   Name Primary?    Acute cough Yes    Exposure to COVID-19 virus     Exposure to influenza     Post-nasal drip     Fatigue, unspecified type        1. Acute cough  Take medications as  directed. Side effects/ risks discussed and patient verbalized understanding of plan. To follow up if symptoms worsen.    ? Low grade sinus    Follow up if worsening - azithromycin 250 MG Oral Tab; Take 2 tablets (500 mg total) by mouth daily for 1 day, THEN 1 tablet (250 mg total) daily for 4 days.  Dispense: 6 tablet; Refill: 0  - XR CHEST PA + LAT CHEST (CPT=71046); Future    2. Exposure to COVID-19 virus  Patient tested neg for covid at home     3. Exposure to influenza      4. Post-nasal drip  Add antihistamine   - azithromycin 250 MG Oral Tab; Take 2 tablets (500 mg total) by mouth daily for 1 day, THEN 1 tablet (250 mg total) daily for 4 days.  Dispense: 6 tablet; Refill: 0    5. Fatigue, unspecified type  Push fluids  Follow up if persists or worsens       No orders of the defined types were placed in this encounter.        The above note was creating using Dragon speech recognition technology. Please excuse any typos    Meds This Visit:  Requested Prescriptions     Signed Prescriptions Disp Refills    azithromycin 250 MG Oral Tab 6 tablet 0     Sig: Take 2 tablets (500 mg total) by mouth daily for 1 day, THEN 1 tablet (250 mg total) daily for 4 days.       Imaging & Referrals:  XR CHEST PA + LAT CHEST (CPT=71046)       ID#[id number]       [1]   Allergies  Allergen Reactions    Codeine NAUSEA AND VOMITING

## 2025-02-18 ENCOUNTER — TELEPHONE (OUTPATIENT)
Dept: INTERNAL MEDICINE CLINIC | Facility: CLINIC | Age: 65
End: 2025-02-18

## 2025-02-26 RX ORDER — POTASSIUM CHLORIDE 1500 MG/1
20 TABLET, EXTENDED RELEASE ORAL DAILY
Qty: 90 TABLET | Refills: 0 | Status: CANCELLED | OUTPATIENT
Start: 2025-02-26

## 2025-02-27 NOTE — TELEPHONE ENCOUNTER
Call center please call patient to establish care with new care provider.   Former patient of Dr. Cassidy.(Retired)

## 2025-02-27 NOTE — TELEPHONE ENCOUNTER
Please review.  Protocol failed/has no protocol.    Last Office Visit: 07/19/2024  Former patient of Dr. Brayden Cassidy.   My chart message sent to establish care with a new doctor or preferred .

## 2025-02-28 RX ORDER — POTASSIUM CHLORIDE 1500 MG/1
20 TABLET, EXTENDED RELEASE ORAL DAILY
Qty: 90 TABLET | Refills: 0 | Status: SHIPPED | OUTPATIENT
Start: 2025-02-28 | End: 2025-03-04

## 2025-03-04 ENCOUNTER — OFFICE VISIT (OUTPATIENT)
Dept: INTERNAL MEDICINE CLINIC | Facility: CLINIC | Age: 65
End: 2025-03-04
Payer: COMMERCIAL

## 2025-03-04 VITALS
HEIGHT: 72 IN | BODY MASS INDEX: 24.11 KG/M2 | HEART RATE: 103 BPM | WEIGHT: 178 LBS | DIASTOLIC BLOOD PRESSURE: 84 MMHG | SYSTOLIC BLOOD PRESSURE: 125 MMHG

## 2025-03-04 DIAGNOSIS — E78.5 DYSLIPIDEMIA: ICD-10-CM

## 2025-03-04 DIAGNOSIS — K76.0 HEPATIC STEATOSIS: ICD-10-CM

## 2025-03-04 DIAGNOSIS — I10 ESSENTIAL HYPERTENSION: Primary | ICD-10-CM

## 2025-03-04 DIAGNOSIS — E11.9 TYPE 2 DIABETES MELLITUS WITHOUT COMPLICATION, WITHOUT LONG-TERM CURRENT USE OF INSULIN (HCC): ICD-10-CM

## 2025-03-04 DIAGNOSIS — I25.10 ASHD (ARTERIOSCLEROTIC HEART DISEASE): ICD-10-CM

## 2025-03-04 DIAGNOSIS — G47.33 OBSTRUCTIVE SLEEP APNEA: ICD-10-CM

## 2025-03-04 PROCEDURE — 3079F DIAST BP 80-89 MM HG: CPT | Performed by: INTERNAL MEDICINE

## 2025-03-04 PROCEDURE — 3074F SYST BP LT 130 MM HG: CPT | Performed by: INTERNAL MEDICINE

## 2025-03-04 PROCEDURE — 3008F BODY MASS INDEX DOCD: CPT | Performed by: INTERNAL MEDICINE

## 2025-03-04 PROCEDURE — 99214 OFFICE O/P EST MOD 30 MIN: CPT | Performed by: INTERNAL MEDICINE

## 2025-03-04 RX ORDER — POTASSIUM CHLORIDE 1500 MG/1
20 TABLET, EXTENDED RELEASE ORAL DAILY
Qty: 90 TABLET | Refills: 1 | Status: SHIPPED | OUTPATIENT
Start: 2025-03-04

## 2025-03-04 RX ORDER — GLIMEPIRIDE 4 MG/1
4 TABLET ORAL
Qty: 90 TABLET | Refills: 1 | Status: SHIPPED | OUTPATIENT
Start: 2025-03-04

## 2025-03-04 NOTE — ASSESSMENT & PLAN NOTE
Likely due to metabolic syndrome.  Consider checking right upper quadrant ultrasound w/ elastography, will discuss at next physical.

## 2025-03-04 NOTE — ASSESSMENT & PLAN NOTE
Well controlled today. Check labs to ensure stability of electrolytes and kidney function. Cont meds as per HPI. Cont K supplementation.   Orders:    Comp Metabolic Panel (14); Future    Microalb/Creat Ratio, Random Urine; Future

## 2025-03-04 NOTE — ASSESSMENT & PLAN NOTE
Continue metformin 1000 mg twice daily and glimepiride 4 mg daily.  Check A1c.  Adjust medications as needed.  He is up-to-date with ophthalmology.  Will do foot exam at next physical.  Check urine microalbumin.  Orders:    Comp Metabolic Panel (14); Future    Microalb/Creat Ratio, Random Urine; Future

## 2025-03-04 NOTE — ASSESSMENT & PLAN NOTE
Cont aspirin and statin.  Need control of other risk factors, including diabetes.  Continue blood pressure management.

## 2025-03-04 NOTE — PROGRESS NOTES
Cl Chowdary is a 64 year old male with complaints of:  Chief Complaint: Establish Care and Medication Request (Requesting potassium )    HPI     Cl Chowdary is a(n) 64 year old male with a history of diabetes, SPEEDY, hepatic steatosis, hypertension, hyperlipidemia, subclinical CAD, who presents to establish care.    Diabetes.  Patient continues on glimepiride 4 mg daily, metformin 1000 mg twice daily.  Patient's last A1c was done 7/19/2024, was 7.6.  Last urine microalbumin creatinine ratio was done 7/19/2024.  Was within normal limits. Last seen 1 month ago, diabetic eye exam was done, neg.     Hypertension.  Patient continues on losartan-hydrochlorothiazide 100-25 mg daily.  Blood pressure is 125/84 today.    Hyperlipidemia.  Patient continues on atorvastatin 20 mg daily.  His last cholesterol panel was done 7/19/2024.  Was notable for an LDL of 57, and total cholesterol of 113.    Fatty liver.  Iron studies, hepatitis panel, ANTHONY all negative.  Likely due to metabolic syndrome. RUQ US done 8/2017.     SPEEDY.  Uses CPAP every night. Uses a travel machine, and a home machine.     Subclinical CAD.  Patient had a CT scan in April 2024 with a calcium score 168.  Had a stress echo done 5/2024, which was normal.  He continues on atorvastatin 20 mg daily as above, and on aspirin 81 mg daily.    Past Medical History     Past Medical History:    Aortic atherosclerosis    CT scan 8-17    ASHD (arteriosclerotic heart disease)    Subclinical; cardiac CT 4-24 with calcium score 168 (LCx>LAD>RCA); stress Echo 5-24 normal    Diverticulosis    Colonoscopy 10-12    Dyslipidemia associated with type 2 diabetes mellitus (HCC)    Essential hypertension    Hepatic steatosis    Workup (iron studies, hepatitis virus serologies, ANTHONY) negative    Hx of adenomatous colonic polyps    Obstructive sleep apnea    On nightly CPAP    Pneumonia    Type 2 diabetes mellitus (HCC)        Past Surgical History     Past Surgical  History:   Procedure Laterality Date    Colonoscopy N/A 3/10/2023    Procedure: COLONOSCOPY;  Surgeon: Erasmo Cortés MD;  Location: [facility] ENDO    Eye surgery Bilateral 2002    Radial keratotomy    Eye surgery Left October 2013    Shoulder arthroscopy Right March 2011    Rotator cuff repair    Shoulder arthroscopy Right September 2011    Rotator cuff repair    Vasectomy  1997        Family History     Family History   Problem Relation Age of Onset    Other (Sinus Cancer) Father     Diabetes Mother     Hypertension Mother     Lipids Mother     Dementia Mother     Diabetes Paternal Grandmother     Hypertension Paternal Grandmother     Hypertension Brother     Colon Cancer Paternal Grandfather     Breast Cancer Sister        Social History     Social History     Socioeconomic History    Marital status:    Tobacco Use    Smoking status: Never     Passive exposure: Never    Smokeless tobacco: Never   Vaping Use    Vaping status: Never Used   Substance and Sexual Activity    Alcohol use: Not Currently    Drug use: No   Other Topics Concern    Caffeine Concern Yes     Comment: coffee/tea       Allergies     Allergies[1]    Current Medications     Current Outpatient Medications   Medication Sig Dispense Refill    potassium chloride (KLOR-CON M20) 20 MEQ Oral Tab CR TAKE 1 TABLET DAILY 90 tablet 0    glimepiride 4 MG Oral Tab Take 1 tablet (4 mg total) by mouth every morning before breakfast. 90 tablet 0    losartan-hydroCHLOROthiazide 100-25 MG Oral Tab Take 1 tablet by mouth daily. 90 tablet 3    metFORMIN HCl 1000 MG Oral Tab Take 1 tablet (1,000 mg total) by mouth 2 (two) times daily with meals. 180 tablet 3    atorvastatin 20 MG Oral Tab Take 1 tablet (20 mg total) by mouth nightly. 90 tablet 3    aspirin 81 MG Oral Tab EC Take 1 tablet (81 mg total) by mouth daily.      Multiple Vitamins-Minerals (MULTI-VITAMIN/MINERALS) Oral Tab Take 1 tablet by mouth daily.       No current facility-administered medications  for this visit.       Review of Systems     GENERAL HEALTH: feels well otherwise  SKIN: denies any unusual skin lesions or rashes  RESPIRATORY: denies shortness of breath with exertion  CARDIOVASCULAR: denies chest pain on exertion  GI: denies abdominal pain and denies heartburn  : denies any burning with urination, urinary frequency or urgency  NEURO: denies headaches, numbness or tingling, mental status changes  PSYCH: denies depressed mood, anxiety  MUSC: denies muscle aches, joint pain    Physical Exam     /84 (BP Location: Right arm, Patient Position: Sitting, Cuff Size: large)   Pulse 103   Ht 6' (1.829 m)   Wt 178 lb (80.7 kg)   BMI 24.14 kg/m²     GENERAL: well developed, well nourished,in no apparent distress  SKIN: no rashes,no suspicious lesions  HEENT: atraumatic, normocephalic,ears and throat are clear  NECK: supple,no adenopathy,no bruits  LUNGS: clear to auscultation  CARDIO: RRR without murmur  GI: good BS's,no masses, HSM or tenderness  EXTREMITIES: no cyanosis, clubbing or edema    Assessment and Plan     Assessment & Plan  Essential hypertension  Well controlled today. Check labs to ensure stability of electrolytes and kidney function. Cont meds as per HPI. Cont K supplementation.   Orders:    Comp Metabolic Panel (14); Future    Microalb/Creat Ratio, Random Urine; Future    Dyslipidemia  Well controlled as per last check. Check CMP to check stability of liver function. Cont meds as per HPI.  Orders:    Comp Metabolic Panel (14); Future    Lipid Panel; Future    ASHD (arteriosclerotic heart disease)  Cont aspirin and statin.  Need control of other risk factors, including diabetes.  Continue blood pressure management.       Type 2 diabetes mellitus without complication, without long-term current use of insulin (HCC)  Continue metformin 1000 mg twice daily and glimepiride 4 mg daily.  Check A1c.  Adjust medications as needed.  He is up-to-date with ophthalmology.  Will do foot exam at  next physical.  Check urine microalbumin.  Orders:    Comp Metabolic Panel (14); Future    Microalb/Creat Ratio, Random Urine; Future    Obstructive sleep apnea  Continue using CPAP.       Hepatic steatosis  Likely due to metabolic syndrome.  Consider checking right upper quadrant ultrasound w/ elastography, will discuss at next physical.             glimepiride 4 MG Oral Tab Take 1 tablet (4 mg total) by mouth every morning before breakfast. 90 tablet 1    potassium chloride (KLOR-CON M20) 20 MEQ Oral Tab CR Take 1 tablet (20 mEq total) by mouth daily. 90 tablet 1    losartan-hydroCHLOROthiazide 100-25 MG Oral Tab Take 1 tablet by mouth daily. 90 tablet 3    metFORMIN HCl 1000 MG Oral Tab Take 1 tablet (1,000 mg total) by mouth 2 (two) times daily with meals. 180 tablet 3    atorvastatin 20 MG Oral Tab Take 1 tablet (20 mg total) by mouth nightly. 90 tablet 3    aspirin 81 MG Oral Tab EC Take 1 tablet (81 mg total) by mouth daily.      Multiple Vitamins-Minerals (MULTI-VITAMIN/MINERALS) Oral Tab Take 1 tablet by mouth daily.         Requested Prescriptions     Signed Prescriptions Disp Refills    glimepiride 4 MG Oral Tab 90 tablet 1     Sig: Take 1 tablet (4 mg total) by mouth every morning before breakfast.    potassium chloride (KLOR-CON M20) 20 MEQ Oral Tab CR 90 tablet 1     Sig: Take 1 tablet (20 mEq total) by mouth daily.       Orders Placed This Encounter   Procedures    Comp Metabolic Panel (14)     Standing Status:   Future     Standing Expiration Date:   3/4/2026    Lipid Panel     Standing Status:   Future     Standing Expiration Date:   3/4/2026     Order Specific Question:   Release to patient     Answer:   Immediate    Microalb/Creat Ratio, Random Urine     Standing Status:   Future     Standing Expiration Date:   3/4/2026     Order Specific Question:   Release to patient     Answer:   Immediate       No follow-ups on file.    The patient indicates understanding of these issues and agrees to the  plan.    Electronically signed by Nettie Nichols MD 03/04/25             [1]   Allergies  Allergen Reactions    Codeine NAUSEA AND VOMITING

## 2025-03-05 ENCOUNTER — PATIENT MESSAGE (OUTPATIENT)
Dept: INTERNAL MEDICINE CLINIC | Facility: CLINIC | Age: 65
End: 2025-03-05

## 2025-03-05 ENCOUNTER — LAB ENCOUNTER (OUTPATIENT)
Dept: LAB | Age: 65
End: 2025-03-05
Attending: INTERNAL MEDICINE
Payer: COMMERCIAL

## 2025-03-05 DIAGNOSIS — E78.5 DYSLIPIDEMIA: ICD-10-CM

## 2025-03-05 DIAGNOSIS — I10 ESSENTIAL HYPERTENSION: ICD-10-CM

## 2025-03-05 DIAGNOSIS — E11.9 TYPE 2 DIABETES MELLITUS WITHOUT COMPLICATION, WITHOUT LONG-TERM CURRENT USE OF INSULIN (HCC): Primary | ICD-10-CM

## 2025-03-05 DIAGNOSIS — E11.9 TYPE 2 DIABETES MELLITUS WITHOUT COMPLICATION, WITHOUT LONG-TERM CURRENT USE OF INSULIN (HCC): ICD-10-CM

## 2025-03-05 LAB
ALBUMIN SERPL-MCNC: 4.6 G/DL (ref 3.2–4.8)
ALBUMIN/GLOB SERPL: 1.6 {RATIO} (ref 1–2)
ALP LIVER SERPL-CCNC: 51 U/L
ALT SERPL-CCNC: 64 U/L
ANION GAP SERPL CALC-SCNC: 11 MMOL/L (ref 0–18)
AST SERPL-CCNC: 33 U/L (ref ?–34)
BILIRUB SERPL-MCNC: 1.2 MG/DL (ref 0.2–1.1)
BUN BLD-MCNC: 15 MG/DL (ref 9–23)
BUN/CREAT SERPL: 15.2 (ref 10–20)
CALCIUM BLD-MCNC: 9.1 MG/DL (ref 8.7–10.4)
CHLORIDE SERPL-SCNC: 104 MMOL/L (ref 98–112)
CHOLEST SERPL-MCNC: 115 MG/DL (ref ?–200)
CO2 SERPL-SCNC: 26 MMOL/L (ref 21–32)
CREAT BLD-MCNC: 0.99 MG/DL
CREAT UR-SCNC: 300.3 MG/DL
EGFRCR SERPLBLD CKD-EPI 2021: 86 ML/MIN/1.73M2 (ref 60–?)
FASTING PATIENT LIPID ANSWER: YES
FASTING STATUS PATIENT QL REPORTED: YES
GLOBULIN PLAS-MCNC: 2.8 G/DL (ref 2–3.5)
GLUCOSE BLD-MCNC: 159 MG/DL (ref 70–99)
HDLC SERPL-MCNC: 42 MG/DL (ref 40–59)
LDLC SERPL CALC-MCNC: 56 MG/DL (ref ?–100)
MICROALBUMIN UR-MCNC: 8.6 MG/DL
MICROALBUMIN/CREAT 24H UR-RTO: 28.6 UG/MG (ref ?–30)
NONHDLC SERPL-MCNC: 73 MG/DL (ref ?–130)
OSMOLALITY SERPL CALC.SUM OF ELEC: 296 MOSM/KG (ref 275–295)
POTASSIUM SERPL-SCNC: 3.7 MMOL/L (ref 3.5–5.1)
PROT SERPL-MCNC: 7.4 G/DL (ref 5.7–8.2)
SODIUM SERPL-SCNC: 141 MMOL/L (ref 136–145)
TRIGL SERPL-MCNC: 90 MG/DL (ref 30–149)
VLDLC SERPL CALC-MCNC: 13 MG/DL (ref 0–30)

## 2025-03-05 PROCEDURE — 82570 ASSAY OF URINE CREATININE: CPT

## 2025-03-05 PROCEDURE — 80061 LIPID PANEL: CPT

## 2025-03-05 PROCEDURE — 82043 UR ALBUMIN QUANTITATIVE: CPT

## 2025-03-05 PROCEDURE — 80053 COMPREHEN METABOLIC PANEL: CPT

## 2025-03-05 PROCEDURE — 36415 COLL VENOUS BLD VENIPUNCTURE: CPT

## 2025-03-06 ENCOUNTER — LAB ENCOUNTER (OUTPATIENT)
Dept: LAB | Age: 65
End: 2025-03-06
Attending: INTERNAL MEDICINE
Payer: COMMERCIAL

## 2025-03-06 DIAGNOSIS — E11.9 TYPE 2 DIABETES MELLITUS WITHOUT COMPLICATION, WITHOUT LONG-TERM CURRENT USE OF INSULIN (HCC): ICD-10-CM

## 2025-03-06 LAB
EST. AVERAGE GLUCOSE BLD GHB EST-MCNC: 151 MG/DL (ref 68–126)
HBA1C MFR BLD: 6.9 % (ref ?–5.7)

## 2025-03-06 PROCEDURE — 83036 HEMOGLOBIN GLYCOSYLATED A1C: CPT

## 2025-03-06 PROCEDURE — 36415 COLL VENOUS BLD VENIPUNCTURE: CPT

## 2025-08-15 DIAGNOSIS — I10 ESSENTIAL HYPERTENSION: ICD-10-CM

## 2025-08-15 DIAGNOSIS — E78.5 DYSLIPIDEMIA: ICD-10-CM

## 2025-08-18 DIAGNOSIS — E78.5 DYSLIPIDEMIA: ICD-10-CM

## 2025-08-18 DIAGNOSIS — I10 ESSENTIAL HYPERTENSION: ICD-10-CM

## 2025-08-18 RX ORDER — LOSARTAN POTASSIUM AND HYDROCHLOROTHIAZIDE 25; 100 MG/1; MG/1
1 TABLET ORAL DAILY
Qty: 90 TABLET | Refills: 3 | Status: CANCELLED | OUTPATIENT
Start: 2025-08-18

## 2025-08-18 RX ORDER — ATORVASTATIN CALCIUM 20 MG/1
20 TABLET, FILM COATED ORAL NIGHTLY
Qty: 90 TABLET | Refills: 3 | Status: CANCELLED | OUTPATIENT
Start: 2025-08-18

## 2025-08-19 RX ORDER — ATORVASTATIN CALCIUM 20 MG/1
20 TABLET, FILM COATED ORAL NIGHTLY
Qty: 90 TABLET | Refills: 3 | Status: SHIPPED | OUTPATIENT
Start: 2025-08-19

## 2025-08-19 RX ORDER — LOSARTAN POTASSIUM AND HYDROCHLOROTHIAZIDE 25; 100 MG/1; MG/1
1 TABLET ORAL DAILY
Qty: 90 TABLET | Refills: 3 | Status: SHIPPED | OUTPATIENT
Start: 2025-08-19

## 2025-08-26 RX ORDER — GLIMEPIRIDE 4 MG/1
4 TABLET ORAL
Qty: 90 TABLET | Refills: 3 | OUTPATIENT
Start: 2025-08-26

## (undated) DIAGNOSIS — I10 ESSENTIAL HYPERTENSION: ICD-10-CM

## (undated) DEVICE — KIT ENDO ORCAPOD 160/180/190

## (undated) DEVICE — MEDI-VAC NON-CONDUCTIVE SUCTION TUBING 6MM X 1.8M (6FT.) L: Brand: CARDINAL HEALTH

## (undated) DEVICE — FORCEP RADIAL JAW 4

## (undated) DEVICE — 60 ML SYRINGE REGULAR TIP: Brand: MONOJECT

## (undated) DEVICE — Device: Brand: DUAL NARE NASAL CANNULAE FEMALE LUER CON 7FT O2 TUBE

## (undated) DEVICE — KIT CLEAN ENDOKIT 1.1OZ GOWNX2

## (undated) NOTE — LETTER
5/14/2020              [address]        [facility] [id number]         Dear Damon Johnson,      The report of the Biopsies done on 5/12/20 show benign moles. These are benign (not cancerous) growths, and requires no further treatment.